# Patient Record
Sex: MALE | Race: WHITE | Employment: OTHER | URBAN - METROPOLITAN AREA
[De-identification: names, ages, dates, MRNs, and addresses within clinical notes are randomized per-mention and may not be internally consistent; named-entity substitution may affect disease eponyms.]

---

## 2018-10-25 ENCOUNTER — APPOINTMENT (OUTPATIENT)
Dept: ULTRASOUND IMAGING | Age: 68
End: 2018-10-25
Payer: MEDICARE

## 2018-10-25 ENCOUNTER — HOSPITAL ENCOUNTER (EMERGENCY)
Age: 68
Discharge: HOME | End: 2018-10-25
Attending: EMERGENCY MEDICINE
Payer: MEDICARE

## 2018-10-25 ENCOUNTER — APPOINTMENT (OUTPATIENT)
Dept: CT IMAGING | Age: 68
End: 2018-10-25
Payer: MEDICARE

## 2018-10-25 VITALS
HEART RATE: 76 BPM | BODY MASS INDEX: 31.1 KG/M2 | OXYGEN SATURATION: 93 % | SYSTOLIC BLOOD PRESSURE: 111 MMHG | RESPIRATION RATE: 20 BRPM | DIASTOLIC BLOOD PRESSURE: 76 MMHG | TEMPERATURE: 98.1 F | HEIGHT: 69 IN | WEIGHT: 210 LBS

## 2018-10-25 DIAGNOSIS — N50.811 RIGHT TESTICULAR PAIN: ICD-10-CM

## 2018-10-25 DIAGNOSIS — N23 RENAL COLIC: Primary | ICD-10-CM

## 2018-10-25 DIAGNOSIS — N20.1 URETEROLITHIASIS: ICD-10-CM

## 2018-10-25 DIAGNOSIS — N13.9 OBSTRUCTIVE UROPATHY: ICD-10-CM

## 2018-10-25 LAB
A/G RATIO: 1.9 (ref 1.1–2.2)
ALBUMIN SERPL-MCNC: 4.1 G/DL (ref 3.4–5)
ALP BLD-CCNC: 54 U/L (ref 40–129)
ALT SERPL-CCNC: 27 U/L (ref 10–40)
ANION GAP SERPL CALCULATED.3IONS-SCNC: 7 MMOL/L (ref 3–16)
AST SERPL-CCNC: 18 U/L (ref 15–37)
BILIRUB SERPL-MCNC: 0.4 MG/DL (ref 0–1)
BILIRUBIN URINE: NEGATIVE
BLOOD, URINE: NEGATIVE
BUN BLDV-MCNC: 15 MG/DL (ref 7–20)
CALCIUM SERPL-MCNC: 9.3 MG/DL (ref 8.3–10.6)
CHLORIDE BLD-SCNC: 106 MMOL/L (ref 99–110)
CLARITY: CLEAR
CO2: 28 MMOL/L (ref 21–32)
COLOR: YELLOW
CREAT SERPL-MCNC: 0.8 MG/DL (ref 0.8–1.3)
GFR AFRICAN AMERICAN: >60
GFR NON-AFRICAN AMERICAN: >60
GLOBULIN: 2.2 G/DL
GLUCOSE BLD-MCNC: 104 MG/DL (ref 70–99)
GLUCOSE URINE: NEGATIVE MG/DL
HCT VFR BLD CALC: 45.3 % (ref 40.5–52.5)
HEMOGLOBIN: 15.7 G/DL (ref 13.5–17.5)
KETONES, URINE: NEGATIVE MG/DL
LEUKOCYTE ESTERASE, URINE: NEGATIVE
MCH RBC QN AUTO: 30.8 PG (ref 26–34)
MCHC RBC AUTO-ENTMCNC: 34.7 G/DL (ref 31–36)
MCV RBC AUTO: 88.7 FL (ref 80–100)
MICROSCOPIC EXAMINATION: NORMAL
NITRITE, URINE: NEGATIVE
PDW BLD-RTO: 12.5 % (ref 12.4–15.4)
PH UA: 6
PLATELET # BLD: 206 K/UL (ref 135–450)
PMV BLD AUTO: 8.1 FL (ref 5–10.5)
POTASSIUM SERPL-SCNC: 4.4 MMOL/L (ref 3.5–5.1)
PROTEIN UA: NEGATIVE MG/DL
RBC # BLD: 5.11 M/UL (ref 4.2–5.9)
SODIUM BLD-SCNC: 141 MMOL/L (ref 136–145)
SPECIFIC GRAVITY UA: 1.02
TOTAL PROTEIN: 6.3 G/DL (ref 6.4–8.2)
URINE TYPE: NORMAL
UROBILINOGEN, URINE: 0.2 E.U./DL
WBC # BLD: 10.7 K/UL (ref 4–11)

## 2018-10-25 PROCEDURE — 76870 US EXAM SCROTUM: CPT

## 2018-10-25 PROCEDURE — 6360000002 HC RX W HCPCS: Performed by: EMERGENCY MEDICINE

## 2018-10-25 PROCEDURE — 96375 TX/PRO/DX INJ NEW DRUG ADDON: CPT

## 2018-10-25 PROCEDURE — 81003 URINALYSIS AUTO W/O SCOPE: CPT

## 2018-10-25 PROCEDURE — 96374 THER/PROPH/DIAG INJ IV PUSH: CPT

## 2018-10-25 PROCEDURE — 99284 EMERGENCY DEPT VISIT MOD MDM: CPT

## 2018-10-25 PROCEDURE — 74176 CT ABD & PELVIS W/O CONTRAST: CPT

## 2018-10-25 PROCEDURE — 85027 COMPLETE CBC AUTOMATED: CPT

## 2018-10-25 PROCEDURE — 80053 COMPREHEN METABOLIC PANEL: CPT

## 2018-10-25 RX ORDER — OXYCODONE HYDROCHLORIDE AND ACETAMINOPHEN 5; 325 MG/1; MG/1
1-2 TABLET ORAL EVERY 4 HOURS PRN
Qty: 15 TABLET | Refills: 0 | Status: SHIPPED | OUTPATIENT
Start: 2018-10-25 | End: 2018-11-01

## 2018-10-25 RX ORDER — MORPHINE SULFATE 4 MG/ML
4 INJECTION, SOLUTION INTRAMUSCULAR; INTRAVENOUS EVERY 30 MIN PRN
Status: DISCONTINUED | OUTPATIENT
Start: 2018-10-25 | End: 2018-10-25 | Stop reason: HOSPADM

## 2018-10-25 RX ORDER — TAMSULOSIN HYDROCHLORIDE 0.4 MG/1
CAPSULE ORAL
Status: ON HOLD | COMMUNITY
Start: 2018-10-08 | End: 2022-06-07 | Stop reason: HOSPADM

## 2018-10-25 RX ORDER — ONDANSETRON 2 MG/ML
4 INJECTION INTRAMUSCULAR; INTRAVENOUS EVERY 30 MIN PRN
Status: DISCONTINUED | OUTPATIENT
Start: 2018-10-25 | End: 2018-10-25 | Stop reason: HOSPADM

## 2018-10-25 RX ORDER — PROCHLORPERAZINE MALEATE 10 MG
TABLET ORAL
Refills: 0 | COMMUNITY
Start: 2018-08-20 | End: 2018-10-25

## 2018-10-25 RX ORDER — CIPROFLOXACIN 500 MG/1
500 TABLET, FILM COATED ORAL 2 TIMES DAILY
Qty: 6 TABLET | Refills: 0 | Status: SHIPPED | OUTPATIENT
Start: 2018-10-25 | End: 2018-10-28

## 2018-10-25 RX ADMIN — MORPHINE SULFATE 4 MG: 4 INJECTION, SOLUTION INTRAMUSCULAR; INTRAVENOUS at 08:03

## 2018-10-25 RX ADMIN — ONDANSETRON 4 MG: 2 INJECTION, SOLUTION INTRAMUSCULAR; INTRAVENOUS at 08:03

## 2018-10-25 ASSESSMENT — ENCOUNTER SYMPTOMS
WHEEZING: 0
CONSTIPATION: 0
ANAL BLEEDING: 0
STRIDOR: 0
COUGH: 0
ABDOMINAL PAIN: 0
ABDOMINAL DISTENTION: 0
NAUSEA: 0
BACK PAIN: 0
VOMITING: 0
EYE PAIN: 0
PHOTOPHOBIA: 0
SORE THROAT: 0
SHORTNESS OF BREATH: 0
VOICE CHANGE: 0
RHINORRHEA: 0
EYE REDNESS: 0
FACIAL SWELLING: 0
BLOOD IN STOOL: 0
EYE ITCHING: 0
CHEST TIGHTNESS: 0
TROUBLE SWALLOWING: 0
EYE DISCHARGE: 0
DIARRHEA: 0
SINUS PRESSURE: 0

## 2018-10-25 ASSESSMENT — PAIN SCALES - GENERAL: PAINLEVEL_OUTOF10: 9

## 2018-10-25 ASSESSMENT — PAIN DESCRIPTION - PAIN TYPE: TYPE: ACUTE PAIN

## 2018-10-25 ASSESSMENT — PAIN DESCRIPTION - LOCATION: LOCATION: PENIS;SCROTUM

## 2018-10-25 ASSESSMENT — PAIN DESCRIPTION - FREQUENCY: FREQUENCY: CONTINUOUS

## 2018-10-25 ASSESSMENT — PAIN DESCRIPTION - DESCRIPTORS: DESCRIPTORS: CONSTANT

## 2018-10-25 NOTE — ED NOTES
Pt assisted up to bedside to void with urinal.  Urine specimen collected.        Luiza Galaviz RN  10/25/18 8422

## 2018-10-25 NOTE — ED PROVIDER NOTES
GI/Bowel: Evaluation of the hollow GI tract demonstrates a stable diverticulum off the posterior aspect of the gastric fundus. There is a stable duodenal diverticulum as well. There is no abnormal bowel wall thickening, dilatation, or obstruction. The appendix is normal.  There is mild colonic diverticulosis, though no evidence of diverticulitis. Pelvis: There is the aforementioned calculus at the right UVJ. The urinary bladder is otherwise normal.  There is stable prostatomegaly with multiple prostatic calcifications noted. There is no free pelvic fluid or pathologic pelvic lymphadenopathy. Peritoneum/Retroperitoneum: No intraperitoneal free air or free fluid is identified. No pathologic lymphadenopathy is seen. There is atherosclerotic disease of the abdominal aorta, without evidence of aneurysm. There is a small approximate 3.9 x 2.2 cm fat containing ventral hernia, without evidence of incarceration or bowel involvement. Bones/Soft Tissues: There is multilevel degenerative change throughout the thoracolumbar spine with diffuse idiopathic skeletal hyperostosis noted. There are vacuum disc phenomena present at L4-L5 and L5-S1. No osteolytic or osteoblastic lesion is identified. 1. There is a 4 mm calculus at the right UVJ with mild right hydronephrosis. 2. There is a 2 mm nonobstructing calculus within the right kidney lower pole. 3. Stable bilateral renal cysts. 4. Patient status post cholecystectomy. 5. Colonic diverticulosis, though no evidence of diverticulitis. 6. Stable mild prostatomegaly. 7. Small fat containing ventral hernia. Us Scrotum And Testicles    Result Date: 10/25/2018  EXAMINATION: ULTRASOUND OF THE SCROTUM/TESTICLES WITH COLOR DOPPLER FLOW EVALUATION 10/25/2018 COMPARISON: None. HISTORY: ORDERING SYSTEM PROVIDED HISTORY: right pain FINDINGS: Measurements: Right testicle: 3.8 x 2.3 x 3.0 cm. Left testicle: 3.5 x 2.1 x 3.3 cm. Right: Grey scale:   The right testicle demonstrates

## 2022-06-05 ENCOUNTER — HOSPITAL ENCOUNTER (INPATIENT)
Age: 72
LOS: 1 days | Discharge: HOME | DRG: 694 | End: 2022-06-07
Attending: EMERGENCY MEDICINE | Admitting: INTERNAL MEDICINE
Payer: MEDICARE

## 2022-06-05 DIAGNOSIS — I25.10 CORONARY ARTERY CALCIFICATION SEEN ON CAT SCAN: ICD-10-CM

## 2022-06-05 DIAGNOSIS — M54.50 ACUTE BILATERAL LOW BACK PAIN WITHOUT SCIATICA: ICD-10-CM

## 2022-06-05 DIAGNOSIS — R26.2 TROUBLE WALKING: ICD-10-CM

## 2022-06-05 DIAGNOSIS — N20.1 RIGHT URETERAL STONE: Primary | ICD-10-CM

## 2022-06-05 LAB
A/G RATIO: 2 (ref 1.1–2.2)
ALBUMIN SERPL-MCNC: 4.5 G/DL (ref 3.4–5)
ALP BLD-CCNC: 55 U/L (ref 40–129)
ALT SERPL-CCNC: 29 U/L (ref 10–40)
ANION GAP SERPL CALCULATED.3IONS-SCNC: 12 MMOL/L (ref 3–16)
AST SERPL-CCNC: 26 U/L (ref 15–37)
BASOPHILS ABSOLUTE: 0.1 K/UL (ref 0–0.2)
BASOPHILS RELATIVE PERCENT: 0.8 %
BILIRUB SERPL-MCNC: <0.2 MG/DL (ref 0–1)
BILIRUBIN URINE: NEGATIVE
BLOOD, URINE: NEGATIVE
BUN BLDV-MCNC: 17 MG/DL (ref 7–20)
CALCIUM SERPL-MCNC: 9.3 MG/DL (ref 8.3–10.6)
CHLORIDE BLD-SCNC: 103 MMOL/L (ref 99–110)
CLARITY: CLEAR
CO2: 24 MMOL/L (ref 21–32)
COLOR: YELLOW
CREAT SERPL-MCNC: 0.8 MG/DL (ref 0.8–1.3)
EOSINOPHILS ABSOLUTE: 0.4 K/UL (ref 0–0.6)
EOSINOPHILS RELATIVE PERCENT: 4.9 %
GFR AFRICAN AMERICAN: >60
GFR NON-AFRICAN AMERICAN: >60
GLUCOSE BLD-MCNC: 144 MG/DL (ref 70–99)
GLUCOSE URINE: NEGATIVE MG/DL
HCT VFR BLD CALC: 46.2 % (ref 40.5–52.5)
HEMOGLOBIN: 15.7 G/DL (ref 13.5–17.5)
KETONES, URINE: NEGATIVE MG/DL
LEUKOCYTE ESTERASE, URINE: NEGATIVE
LIPASE: 26 U/L (ref 13–60)
LYMPHOCYTES ABSOLUTE: 1.8 K/UL (ref 1–5.1)
LYMPHOCYTES RELATIVE PERCENT: 19.9 %
MAGNESIUM: 2 MG/DL (ref 1.8–2.4)
MCH RBC QN AUTO: 30.1 PG (ref 26–34)
MCHC RBC AUTO-ENTMCNC: 33.9 G/DL (ref 31–36)
MCV RBC AUTO: 88.9 FL (ref 80–100)
MICROSCOPIC EXAMINATION: NORMAL
MONOCYTES ABSOLUTE: 0.5 K/UL (ref 0–1.3)
MONOCYTES RELATIVE PERCENT: 5.3 %
NEUTROPHILS ABSOLUTE: 6.1 K/UL (ref 1.7–7.7)
NEUTROPHILS RELATIVE PERCENT: 69.1 %
NITRITE, URINE: NEGATIVE
PDW BLD-RTO: 12.6 % (ref 12.4–15.4)
PH UA: 5.5 (ref 5–8)
PLATELET # BLD: 237 K/UL (ref 135–450)
PMV BLD AUTO: 8 FL (ref 5–10.5)
POTASSIUM SERPL-SCNC: 3.7 MMOL/L (ref 3.5–5.1)
PROTEIN UA: NEGATIVE MG/DL
RBC # BLD: 5.2 M/UL (ref 4.2–5.9)
SODIUM BLD-SCNC: 139 MMOL/L (ref 136–145)
SPECIFIC GRAVITY UA: >=1.03 (ref 1–1.03)
TOTAL PROTEIN: 6.7 G/DL (ref 6.4–8.2)
URINE REFLEX TO CULTURE: NORMAL
URINE TYPE: NORMAL
UROBILINOGEN, URINE: 0.2 E.U./DL
WBC # BLD: 8.8 K/UL (ref 4–11)

## 2022-06-05 PROCEDURE — 96375 TX/PRO/DX INJ NEW DRUG ADDON: CPT

## 2022-06-05 PROCEDURE — 85025 COMPLETE CBC W/AUTO DIFF WBC: CPT

## 2022-06-05 PROCEDURE — 6360000002 HC RX W HCPCS: Performed by: EMERGENCY MEDICINE

## 2022-06-05 PROCEDURE — 83735 ASSAY OF MAGNESIUM: CPT

## 2022-06-05 PROCEDURE — 2580000003 HC RX 258: Performed by: EMERGENCY MEDICINE

## 2022-06-05 PROCEDURE — 6370000000 HC RX 637 (ALT 250 FOR IP): Performed by: EMERGENCY MEDICINE

## 2022-06-05 PROCEDURE — 80053 COMPREHEN METABOLIC PANEL: CPT

## 2022-06-05 PROCEDURE — 83690 ASSAY OF LIPASE: CPT

## 2022-06-05 PROCEDURE — 81003 URINALYSIS AUTO W/O SCOPE: CPT

## 2022-06-05 PROCEDURE — 96374 THER/PROPH/DIAG INJ IV PUSH: CPT

## 2022-06-05 PROCEDURE — 99285 EMERGENCY DEPT VISIT HI MDM: CPT

## 2022-06-05 RX ORDER — DIAZEPAM 5 MG/1
5 TABLET ORAL ONCE
Status: COMPLETED | OUTPATIENT
Start: 2022-06-05 | End: 2022-06-05

## 2022-06-05 RX ORDER — HYDROCODONE BITARTRATE AND ACETAMINOPHEN 5; 325 MG/1; MG/1
1 TABLET ORAL ONCE
Status: COMPLETED | OUTPATIENT
Start: 2022-06-05 | End: 2022-06-05

## 2022-06-05 RX ORDER — 0.9 % SODIUM CHLORIDE 0.9 %
500 INTRAVENOUS SOLUTION INTRAVENOUS ONCE
Status: COMPLETED | OUTPATIENT
Start: 2022-06-05 | End: 2022-06-05

## 2022-06-05 RX ORDER — AZITHROMYCIN 250 MG/1
TABLET, FILM COATED ORAL
Status: ON HOLD | COMMUNITY
Start: 2022-04-06 | End: 2022-06-07 | Stop reason: HOSPADM

## 2022-06-05 RX ORDER — METHOCARBAMOL 500 MG/1
TABLET, FILM COATED ORAL
COMMUNITY
Start: 2022-05-24

## 2022-06-05 RX ORDER — ONDANSETRON 2 MG/ML
4 INJECTION INTRAMUSCULAR; INTRAVENOUS ONCE
Status: COMPLETED | OUTPATIENT
Start: 2022-06-05 | End: 2022-06-05

## 2022-06-05 RX ORDER — METHOCARBAMOL 500 MG/1
500 TABLET, FILM COATED ORAL 3 TIMES DAILY PRN
COMMUNITY
Start: 2022-05-24

## 2022-06-05 RX ORDER — UBIDECARENONE 50 MG
50 CAPSULE ORAL DAILY
COMMUNITY

## 2022-06-05 RX ORDER — MORPHINE SULFATE 4 MG/ML
4 INJECTION, SOLUTION INTRAMUSCULAR; INTRAVENOUS ONCE
Status: DISCONTINUED | OUTPATIENT
Start: 2022-06-05 | End: 2022-06-05

## 2022-06-05 RX ADMIN — HYDROCODONE BITARTRATE AND ACETAMINOPHEN 1 TABLET: 5; 325 TABLET ORAL at 23:25

## 2022-06-05 RX ADMIN — DIAZEPAM 5 MG: 5 TABLET ORAL at 23:25

## 2022-06-05 RX ADMIN — ONDANSETRON 4 MG: 2 INJECTION INTRAMUSCULAR; INTRAVENOUS at 23:24

## 2022-06-05 RX ADMIN — SODIUM CHLORIDE 500 ML: 9 INJECTION, SOLUTION INTRAVENOUS at 23:21

## 2022-06-05 ASSESSMENT — PAIN - FUNCTIONAL ASSESSMENT
PAIN_FUNCTIONAL_ASSESSMENT: 0-10

## 2022-06-05 ASSESSMENT — PAIN DESCRIPTION - DESCRIPTORS
DESCRIPTORS: SPASM
DESCRIPTORS: SPASM

## 2022-06-05 ASSESSMENT — PAIN DESCRIPTION - ORIENTATION
ORIENTATION: LOWER
ORIENTATION: LOWER

## 2022-06-05 ASSESSMENT — PAIN DESCRIPTION - PAIN TYPE
TYPE: ACUTE PAIN
TYPE: ACUTE PAIN

## 2022-06-05 ASSESSMENT — PAIN DESCRIPTION - FREQUENCY
FREQUENCY: INTERMITTENT

## 2022-06-05 ASSESSMENT — PAIN DESCRIPTION - LOCATION
LOCATION: BACK

## 2022-06-05 ASSESSMENT — PAIN SCALES - GENERAL
PAINLEVEL_OUTOF10: 10

## 2022-06-06 ENCOUNTER — APPOINTMENT (OUTPATIENT)
Dept: CT IMAGING | Age: 72
DRG: 694 | End: 2022-06-06
Payer: MEDICARE

## 2022-06-06 ENCOUNTER — ANESTHESIA (OUTPATIENT)
Dept: OPERATING ROOM | Age: 72
DRG: 694 | End: 2022-06-06
Payer: MEDICARE

## 2022-06-06 ENCOUNTER — APPOINTMENT (OUTPATIENT)
Dept: GENERAL RADIOLOGY | Age: 72
DRG: 694 | End: 2022-06-06
Payer: MEDICARE

## 2022-06-06 ENCOUNTER — ANESTHESIA EVENT (OUTPATIENT)
Dept: OPERATING ROOM | Age: 72
DRG: 694 | End: 2022-06-06
Payer: MEDICARE

## 2022-06-06 PROBLEM — I25.10 CORONARY ARTERY CALCIFICATION SEEN ON CAT SCAN: Status: ACTIVE | Noted: 2022-06-06

## 2022-06-06 PROBLEM — N20.0 RIGHT NEPHROLITHIASIS: Status: ACTIVE | Noted: 2022-06-06

## 2022-06-06 LAB
A/G RATIO: 2.6 (ref 1.1–2.2)
ALBUMIN SERPL-MCNC: 4.1 G/DL (ref 3.4–5)
ALP BLD-CCNC: 48 U/L (ref 40–129)
ALT SERPL-CCNC: 23 U/L (ref 10–40)
ANION GAP SERPL CALCULATED.3IONS-SCNC: 10 MMOL/L (ref 3–16)
AST SERPL-CCNC: 23 U/L (ref 15–37)
BASOPHILS ABSOLUTE: 0 K/UL (ref 0–0.2)
BASOPHILS RELATIVE PERCENT: 0.5 %
BILIRUB SERPL-MCNC: 0.4 MG/DL (ref 0–1)
BUN BLDV-MCNC: 17 MG/DL (ref 7–20)
CALCIUM SERPL-MCNC: 8.9 MG/DL (ref 8.3–10.6)
CHLORIDE BLD-SCNC: 106 MMOL/L (ref 99–110)
CO2: 27 MMOL/L (ref 21–32)
CREAT SERPL-MCNC: 0.9 MG/DL (ref 0.8–1.3)
EOSINOPHILS ABSOLUTE: 0.4 K/UL (ref 0–0.6)
EOSINOPHILS RELATIVE PERCENT: 5.4 %
GFR AFRICAN AMERICAN: >60
GFR NON-AFRICAN AMERICAN: >60
GLUCOSE BLD-MCNC: 89 MG/DL (ref 70–99)
GLUCOSE BLD-MCNC: 91 MG/DL (ref 70–99)
HCT VFR BLD CALC: 43.8 % (ref 40.5–52.5)
HEMOGLOBIN: 14.8 G/DL (ref 13.5–17.5)
LYMPHOCYTES ABSOLUTE: 1.7 K/UL (ref 1–5.1)
LYMPHOCYTES RELATIVE PERCENT: 22.1 %
MCH RBC QN AUTO: 30.6 PG (ref 26–34)
MCHC RBC AUTO-ENTMCNC: 33.8 G/DL (ref 31–36)
MCV RBC AUTO: 90.6 FL (ref 80–100)
MONOCYTES ABSOLUTE: 0.5 K/UL (ref 0–1.3)
MONOCYTES RELATIVE PERCENT: 6.9 %
NEUTROPHILS ABSOLUTE: 4.9 K/UL (ref 1.7–7.7)
NEUTROPHILS RELATIVE PERCENT: 65.1 %
PDW BLD-RTO: 12.9 % (ref 12.4–15.4)
PERFORMED ON: NORMAL
PLATELET # BLD: 200 K/UL (ref 135–450)
PMV BLD AUTO: 7.7 FL (ref 5–10.5)
POTASSIUM REFLEX MAGNESIUM: 4.4 MMOL/L (ref 3.5–5.1)
RBC # BLD: 4.84 M/UL (ref 4.2–5.9)
SARS-COV-2, NAAT: NOT DETECTED
SODIUM BLD-SCNC: 143 MMOL/L (ref 136–145)
TOTAL PROTEIN: 5.7 G/DL (ref 6.4–8.2)
WBC # BLD: 7.5 K/UL (ref 4–11)

## 2022-06-06 PROCEDURE — 87635 SARS-COV-2 COVID-19 AMP PRB: CPT

## 2022-06-06 PROCEDURE — 3600000004 HC SURGERY LEVEL 4 BASE: Performed by: UROLOGY

## 2022-06-06 PROCEDURE — 85025 COMPLETE CBC W/AUTO DIFF WBC: CPT

## 2022-06-06 PROCEDURE — 6370000000 HC RX 637 (ALT 250 FOR IP): Performed by: INTERNAL MEDICINE

## 2022-06-06 PROCEDURE — 7100000000 HC PACU RECOVERY - FIRST 15 MIN: Performed by: UROLOGY

## 2022-06-06 PROCEDURE — 3700000001 HC ADD 15 MINUTES (ANESTHESIA): Performed by: UROLOGY

## 2022-06-06 PROCEDURE — 2580000003 HC RX 258: Performed by: UROLOGY

## 2022-06-06 PROCEDURE — 74018 RADEX ABDOMEN 1 VIEW: CPT

## 2022-06-06 PROCEDURE — 6360000002 HC RX W HCPCS: Performed by: UROLOGY

## 2022-06-06 PROCEDURE — G0378 HOSPITAL OBSERVATION PER HR: HCPCS

## 2022-06-06 PROCEDURE — 3209999900 FLUORO FOR SURGICAL PROCEDURES

## 2022-06-06 PROCEDURE — 6360000002 HC RX W HCPCS: Performed by: NURSE ANESTHETIST, CERTIFIED REGISTERED

## 2022-06-06 PROCEDURE — 96375 TX/PRO/DX INJ NEW DRUG ADDON: CPT

## 2022-06-06 PROCEDURE — 96365 THER/PROPH/DIAG IV INF INIT: CPT

## 2022-06-06 PROCEDURE — 2580000003 HC RX 258: Performed by: INTERNAL MEDICINE

## 2022-06-06 PROCEDURE — 7100000001 HC PACU RECOVERY - ADDTL 15 MIN: Performed by: UROLOGY

## 2022-06-06 PROCEDURE — 2500000003 HC RX 250 WO HCPCS: Performed by: NURSE ANESTHETIST, CERTIFIED REGISTERED

## 2022-06-06 PROCEDURE — BT1D1ZZ FLUOROSCOPY OF RIGHT KIDNEY, URETER AND BLADDER USING LOW OSMOLAR CONTRAST: ICD-10-PCS | Performed by: UROLOGY

## 2022-06-06 PROCEDURE — 74177 CT ABD & PELVIS W/CONTRAST: CPT

## 2022-06-06 PROCEDURE — 6360000004 HC RX CONTRAST MEDICATION: Performed by: UROLOGY

## 2022-06-06 PROCEDURE — 2709999900 HC NON-CHARGEABLE SUPPLY: Performed by: UROLOGY

## 2022-06-06 PROCEDURE — 3700000000 HC ANESTHESIA ATTENDED CARE: Performed by: UROLOGY

## 2022-06-06 PROCEDURE — 6360000002 HC RX W HCPCS: Performed by: EMERGENCY MEDICINE

## 2022-06-06 PROCEDURE — 6360000002 HC RX W HCPCS

## 2022-06-06 PROCEDURE — 0TJ98ZZ INSPECTION OF URETER, VIA NATURAL OR ARTIFICIAL OPENING ENDOSCOPIC: ICD-10-PCS | Performed by: UROLOGY

## 2022-06-06 PROCEDURE — 6360000004 HC RX CONTRAST MEDICATION: Performed by: EMERGENCY MEDICINE

## 2022-06-06 PROCEDURE — 6360000002 HC RX W HCPCS: Performed by: INTERNAL MEDICINE

## 2022-06-06 PROCEDURE — 3600000014 HC SURGERY LEVEL 4 ADDTL 15MIN: Performed by: UROLOGY

## 2022-06-06 PROCEDURE — 1200000000 HC SEMI PRIVATE

## 2022-06-06 PROCEDURE — 6370000000 HC RX 637 (ALT 250 FOR IP): Performed by: UROLOGY

## 2022-06-06 PROCEDURE — 36415 COLL VENOUS BLD VENIPUNCTURE: CPT

## 2022-06-06 PROCEDURE — 96366 THER/PROPH/DIAG IV INF ADDON: CPT

## 2022-06-06 PROCEDURE — C1769 GUIDE WIRE: HCPCS | Performed by: UROLOGY

## 2022-06-06 PROCEDURE — 80053 COMPREHEN METABOLIC PANEL: CPT

## 2022-06-06 PROCEDURE — 3209999900 CT LUMBAR SPINE TRAUMA RECONSTRUCTION

## 2022-06-06 RX ORDER — MAGNESIUM SULFATE IN WATER 40 MG/ML
2000 INJECTION, SOLUTION INTRAVENOUS PRN
Status: DISCONTINUED | OUTPATIENT
Start: 2022-06-06 | End: 2022-06-06

## 2022-06-06 RX ORDER — PROPOFOL 10 MG/ML
INJECTION, EMULSION INTRAVENOUS PRN
Status: DISCONTINUED | OUTPATIENT
Start: 2022-06-06 | End: 2022-06-06 | Stop reason: SDUPTHER

## 2022-06-06 RX ORDER — PHENYLEPHRINE HCL IN 0.9% NACL 1 MG/10 ML
SYRINGE (ML) INTRAVENOUS PRN
Status: DISCONTINUED | OUTPATIENT
Start: 2022-06-06 | End: 2022-06-06 | Stop reason: SDUPTHER

## 2022-06-06 RX ORDER — ATORVASTATIN CALCIUM 10 MG/1
20 TABLET, FILM COATED ORAL DAILY
Status: DISCONTINUED | OUTPATIENT
Start: 2022-06-06 | End: 2022-06-07 | Stop reason: HOSPADM

## 2022-06-06 RX ORDER — LIDOCAINE HYDROCHLORIDE 20 MG/ML
INJECTION, SOLUTION INFILTRATION; PERINEURAL PRN
Status: DISCONTINUED | OUTPATIENT
Start: 2022-06-06 | End: 2022-06-06 | Stop reason: SDUPTHER

## 2022-06-06 RX ORDER — MORPHINE SULFATE 4 MG/ML
4 INJECTION, SOLUTION INTRAMUSCULAR; INTRAVENOUS ONCE
Status: DISCONTINUED | OUTPATIENT
Start: 2022-06-06 | End: 2022-06-07 | Stop reason: HOSPADM

## 2022-06-06 RX ORDER — LABETALOL HYDROCHLORIDE 5 MG/ML
10 INJECTION, SOLUTION INTRAVENOUS
Status: DISCONTINUED | OUTPATIENT
Start: 2022-06-06 | End: 2022-06-06 | Stop reason: HOSPADM

## 2022-06-06 RX ORDER — OXYCODONE HYDROCHLORIDE 5 MG/1
5 TABLET ORAL PRN
Status: DISCONTINUED | OUTPATIENT
Start: 2022-06-06 | End: 2022-06-06 | Stop reason: HOSPADM

## 2022-06-06 RX ORDER — OXYCODONE HYDROCHLORIDE 5 MG/1
10 TABLET ORAL PRN
Status: DISCONTINUED | OUTPATIENT
Start: 2022-06-06 | End: 2022-06-06 | Stop reason: HOSPADM

## 2022-06-06 RX ORDER — OXYCODONE HYDROCHLORIDE 5 MG/1
5 TABLET ORAL EVERY 4 HOURS PRN
Status: DISCONTINUED | OUTPATIENT
Start: 2022-06-06 | End: 2022-06-07 | Stop reason: HOSPADM

## 2022-06-06 RX ORDER — ACETAMINOPHEN 325 MG/1
650 TABLET ORAL EVERY 6 HOURS PRN
Status: DISCONTINUED | OUTPATIENT
Start: 2022-06-06 | End: 2022-06-07 | Stop reason: HOSPADM

## 2022-06-06 RX ORDER — DIPHENHYDRAMINE HYDROCHLORIDE 50 MG/ML
12.5 INJECTION INTRAMUSCULAR; INTRAVENOUS
Status: DISCONTINUED | OUTPATIENT
Start: 2022-06-06 | End: 2022-06-06 | Stop reason: HOSPADM

## 2022-06-06 RX ORDER — MAGNESIUM HYDROXIDE 1200 MG/15ML
LIQUID ORAL PRN
Status: DISCONTINUED | OUTPATIENT
Start: 2022-06-06 | End: 2022-06-06 | Stop reason: HOSPADM

## 2022-06-06 RX ORDER — SODIUM CHLORIDE 9 MG/ML
1000 INJECTION, SOLUTION INTRAVENOUS CONTINUOUS
Status: ACTIVE | OUTPATIENT
Start: 2022-06-06 | End: 2022-06-06

## 2022-06-06 RX ORDER — PROMETHAZINE HYDROCHLORIDE 25 MG/1
12.5 TABLET ORAL EVERY 6 HOURS PRN
Status: DISCONTINUED | OUTPATIENT
Start: 2022-06-06 | End: 2022-06-07 | Stop reason: HOSPADM

## 2022-06-06 RX ORDER — FENTANYL CITRATE 50 UG/ML
INJECTION, SOLUTION INTRAMUSCULAR; INTRAVENOUS PRN
Status: DISCONTINUED | OUTPATIENT
Start: 2022-06-06 | End: 2022-06-06 | Stop reason: SDUPTHER

## 2022-06-06 RX ORDER — FAMOTIDINE 20 MG/1
20 TABLET, FILM COATED ORAL 2 TIMES DAILY
Status: DISCONTINUED | OUTPATIENT
Start: 2022-06-06 | End: 2022-06-07 | Stop reason: HOSPADM

## 2022-06-06 RX ORDER — ENOXAPARIN SODIUM 100 MG/ML
40 INJECTION SUBCUTANEOUS DAILY
Status: DISCONTINUED | OUTPATIENT
Start: 2022-06-06 | End: 2022-06-07 | Stop reason: HOSPADM

## 2022-06-06 RX ORDER — SODIUM CHLORIDE 0.9 % (FLUSH) 0.9 %
10 SYRINGE (ML) INJECTION PRN
Status: DISCONTINUED | OUTPATIENT
Start: 2022-06-06 | End: 2022-06-07 | Stop reason: HOSPADM

## 2022-06-06 RX ORDER — SODIUM CHLORIDE 0.9 % (FLUSH) 0.9 %
10 SYRINGE (ML) INJECTION EVERY 12 HOURS SCHEDULED
Status: DISCONTINUED | OUTPATIENT
Start: 2022-06-06 | End: 2022-06-07 | Stop reason: HOSPADM

## 2022-06-06 RX ORDER — ACETAMINOPHEN 650 MG/1
650 SUPPOSITORY RECTAL EVERY 6 HOURS PRN
Status: DISCONTINUED | OUTPATIENT
Start: 2022-06-06 | End: 2022-06-07 | Stop reason: HOSPADM

## 2022-06-06 RX ORDER — MORPHINE SULFATE 4 MG/ML
4 INJECTION, SOLUTION INTRAMUSCULAR; INTRAVENOUS EVERY 4 HOURS PRN
Status: DISCONTINUED | OUTPATIENT
Start: 2022-06-06 | End: 2022-06-07 | Stop reason: HOSPADM

## 2022-06-06 RX ORDER — SODIUM CHLORIDE 9 MG/ML
25 INJECTION, SOLUTION INTRAVENOUS PRN
Status: DISCONTINUED | OUTPATIENT
Start: 2022-06-06 | End: 2022-06-06 | Stop reason: HOSPADM

## 2022-06-06 RX ORDER — ONDANSETRON 2 MG/ML
4 INJECTION INTRAMUSCULAR; INTRAVENOUS EVERY 6 HOURS PRN
Status: DISCONTINUED | OUTPATIENT
Start: 2022-06-06 | End: 2022-06-07 | Stop reason: HOSPADM

## 2022-06-06 RX ORDER — ONDANSETRON 2 MG/ML
4 INJECTION INTRAMUSCULAR; INTRAVENOUS
Status: DISCONTINUED | OUTPATIENT
Start: 2022-06-06 | End: 2022-06-06 | Stop reason: HOSPADM

## 2022-06-06 RX ORDER — SODIUM CHLORIDE 0.9 % (FLUSH) 0.9 %
5-40 SYRINGE (ML) INJECTION PRN
Status: DISCONTINUED | OUTPATIENT
Start: 2022-06-06 | End: 2022-06-06 | Stop reason: HOSPADM

## 2022-06-06 RX ORDER — TAMSULOSIN HYDROCHLORIDE 0.4 MG/1
0.4 CAPSULE ORAL DAILY
Status: DISCONTINUED | OUTPATIENT
Start: 2022-06-06 | End: 2022-06-07 | Stop reason: HOSPADM

## 2022-06-06 RX ORDER — ASPIRIN 81 MG/1
81 TABLET ORAL DAILY
Status: DISCONTINUED | OUTPATIENT
Start: 2022-06-06 | End: 2022-06-07 | Stop reason: HOSPADM

## 2022-06-06 RX ORDER — SODIUM CHLORIDE 9 MG/ML
INJECTION, SOLUTION INTRAVENOUS PRN
Status: DISCONTINUED | OUTPATIENT
Start: 2022-06-06 | End: 2022-06-07 | Stop reason: HOSPADM

## 2022-06-06 RX ORDER — METHOCARBAMOL 500 MG/1
500 TABLET, FILM COATED ORAL 3 TIMES DAILY PRN
Status: DISCONTINUED | OUTPATIENT
Start: 2022-06-06 | End: 2022-06-07 | Stop reason: HOSPADM

## 2022-06-06 RX ORDER — MEPERIDINE HYDROCHLORIDE 50 MG/ML
12.5 INJECTION INTRAMUSCULAR; INTRAVENOUS; SUBCUTANEOUS EVERY 5 MIN PRN
Status: DISCONTINUED | OUTPATIENT
Start: 2022-06-06 | End: 2022-06-06 | Stop reason: HOSPADM

## 2022-06-06 RX ORDER — OXYCODONE HYDROCHLORIDE 5 MG/1
10 TABLET ORAL EVERY 4 HOURS PRN
Status: DISCONTINUED | OUTPATIENT
Start: 2022-06-06 | End: 2022-06-07 | Stop reason: HOSPADM

## 2022-06-06 RX ORDER — KETOROLAC TROMETHAMINE 30 MG/ML
15 INJECTION, SOLUTION INTRAMUSCULAR; INTRAVENOUS ONCE
Status: COMPLETED | OUTPATIENT
Start: 2022-06-06 | End: 2022-06-06

## 2022-06-06 RX ORDER — POTASSIUM CHLORIDE 7.45 MG/ML
10 INJECTION INTRAVENOUS PRN
Status: DISCONTINUED | OUTPATIENT
Start: 2022-06-06 | End: 2022-06-06

## 2022-06-06 RX ORDER — SODIUM CHLORIDE 0.9 % (FLUSH) 0.9 %
5-40 SYRINGE (ML) INJECTION EVERY 12 HOURS SCHEDULED
Status: DISCONTINUED | OUTPATIENT
Start: 2022-06-06 | End: 2022-06-06 | Stop reason: HOSPADM

## 2022-06-06 RX ADMIN — SODIUM CHLORIDE 1000 ML: 9 INJECTION, SOLUTION INTRAVENOUS at 04:22

## 2022-06-06 RX ADMIN — ONDANSETRON 4 MG: 2 INJECTION INTRAMUSCULAR; INTRAVENOUS at 12:33

## 2022-06-06 RX ADMIN — KETOROLAC TROMETHAMINE 15 MG: 30 INJECTION, SOLUTION INTRAMUSCULAR; INTRAVENOUS at 01:55

## 2022-06-06 RX ADMIN — SODIUM CHLORIDE: 9 INJECTION, SOLUTION INTRAVENOUS at 12:20

## 2022-06-06 RX ADMIN — CEFAZOLIN 2000 MG: 10 INJECTION, POWDER, FOR SOLUTION INTRAVENOUS at 20:12

## 2022-06-06 RX ADMIN — Medication 200 MCG: at 12:54

## 2022-06-06 RX ADMIN — ONDANSETRON 4 MG: 2 INJECTION INTRAMUSCULAR; INTRAVENOUS at 20:07

## 2022-06-06 RX ADMIN — HYDROMORPHONE HYDROCHLORIDE 0.5 MG: 1 INJECTION, SOLUTION INTRAMUSCULAR; INTRAVENOUS; SUBCUTANEOUS at 14:09

## 2022-06-06 RX ADMIN — PROPOFOL 150 MG: 10 INJECTION, EMULSION INTRAVENOUS at 12:33

## 2022-06-06 RX ADMIN — Medication 10 ML: at 20:08

## 2022-06-06 RX ADMIN — CEFAZOLIN SODIUM 2 G: 10 INJECTION, POWDER, FOR SOLUTION INTRAVENOUS at 12:20

## 2022-06-06 RX ADMIN — FENTANYL CITRATE 25 MCG: 50 INJECTION INTRAMUSCULAR; INTRAVENOUS at 12:43

## 2022-06-06 RX ADMIN — Medication 100 MCG: at 12:48

## 2022-06-06 RX ADMIN — OXYCODONE 5 MG: 5 TABLET ORAL at 15:11

## 2022-06-06 RX ADMIN — LIDOCAINE HYDROCHLORIDE 60 MG: 20 INJECTION, SOLUTION INFILTRATION; PERINEURAL at 12:33

## 2022-06-06 RX ADMIN — FAMOTIDINE 20 MG: 20 TABLET ORAL at 21:21

## 2022-06-06 RX ADMIN — TAMSULOSIN HYDROCHLORIDE 0.4 MG: 0.4 CAPSULE ORAL at 04:19

## 2022-06-06 RX ADMIN — FENTANYL CITRATE 25 MCG: 50 INJECTION INTRAMUSCULAR; INTRAVENOUS at 12:33

## 2022-06-06 RX ADMIN — IOPAMIDOL 75 ML: 755 INJECTION, SOLUTION INTRAVENOUS at 00:20

## 2022-06-06 RX ADMIN — FENTANYL CITRATE 50 MCG: 50 INJECTION INTRAMUSCULAR; INTRAVENOUS at 12:27

## 2022-06-06 RX ADMIN — Medication 200 MCG: at 12:51

## 2022-06-06 RX ADMIN — Medication 100 MCG: at 12:35

## 2022-06-06 ASSESSMENT — PAIN SCALES - GENERAL
PAINLEVEL_OUTOF10: 0
PAINLEVEL_OUTOF10: 7
PAINLEVEL_OUTOF10: 6
PAINLEVEL_OUTOF10: 0
PAINLEVEL_OUTOF10: 6
PAINLEVEL_OUTOF10: 6
PAINLEVEL_OUTOF10: 7
PAINLEVEL_OUTOF10: 2

## 2022-06-06 ASSESSMENT — PAIN DESCRIPTION - LOCATION
LOCATION: PENIS
LOCATION: PENIS
LOCATION: BACK
LOCATION: BACK

## 2022-06-06 ASSESSMENT — PAIN DESCRIPTION - DESCRIPTORS: DESCRIPTORS: SHARP

## 2022-06-06 ASSESSMENT — ENCOUNTER SYMPTOMS
ABDOMINAL PAIN: 0
VOMITING: 0
NAUSEA: 0
SHORTNESS OF BREATH: 0
BACK PAIN: 1
DIARRHEA: 0

## 2022-06-06 ASSESSMENT — PAIN - FUNCTIONAL ASSESSMENT: PAIN_FUNCTIONAL_ASSESSMENT: NONE - DENIES PAIN

## 2022-06-06 ASSESSMENT — LIFESTYLE VARIABLES: HOW OFTEN DO YOU HAVE A DRINK CONTAINING ALCOHOL: NEVER

## 2022-06-06 ASSESSMENT — PAIN DESCRIPTION - ORIENTATION
ORIENTATION: LEFT
ORIENTATION: LEFT

## 2022-06-06 NOTE — BRIEF OP NOTE
Brief Postoperative Note      Patient: Beth Mariscal  YOB: 1950  MRN: [id number]    Date of Procedure: 6/6/2022    Pre-Op Diagnosis: RIGHT URETERAL STONE    Post-Op Diagnosis: passed right ureteral stone       Procedure(s):  CYSTOSCOPY URETEROSCOPY WITH RIGHT RETROGRADE PYELOGRAM    Surgeon(s):  Amelia Welsh MD    Assistant:  Surgical Assistant: Tierney Bell    Anesthesia: General    Estimated Blood Loss (mL): Minimal    Complications: None    Specimens:   * No specimens in log *    Implants:  * No implants in log *      Drains: * No LDAs found *    Findings: no stone in right ureter, no stent placed    Plan- can discharge later today and f/u as needed from Urology standpoint    Electronically signed by Amelia Welsh MD on 6/6/2022 at 1:04 PM

## 2022-06-06 NOTE — PROGRESS NOTES
Patient back from procedure, alert and oriented, complaining of 7/10 pain in groin.  VSS, CMU aware    Vitals:    06/06/22 1423   BP: 135/78   Pulse: 77   Resp: 18   Temp: 97.4 °F (36.3 °C)   SpO2: 96%

## 2022-06-06 NOTE — PLAN OF CARE
Problem: Pain  Goal: Verbalizes/displays adequate comfort level or baseline comfort level  6/6/2022 0937 by Shon Riedel, RN  Outcome: Progressing  Note: Pt will be satisfied with pain control with PRN morphine or oxycodone. Pt uses numeric pain rating scale with reassessments after pain med administration. Will continue to monitor progression throughout shift. Problem: Safety - Adult  Goal: Free from fall injury  Outcome: Progressing  Note: Pt will remain free from falls throughout hospital stay. Bed in lowest position with wheels locked and side rails 2/4 up. Hourly rounding completed. Patient ambulates safely independently, call light is within reach. Will continue to monitor throughout shift.

## 2022-06-06 NOTE — PROGRESS NOTES
D: Patient continues with oral airway, respirations are even and easy, 0 2 sat's are 97 % on 6 liters via simple mask. A: Assessment completed and documented.

## 2022-06-06 NOTE — ED NOTES
Pt reports LBP progressively worsening the past 2 weeks. Placed on Robaxin 500mg by Primary DR since 05/24/22. Pt reports no longer effective for low back \"spasms\". Denies Radiculopathy or loss of control of bowel and bladder. Reports \"tried Lidocaine patches on my back, we've tried Heat, we've tried cold, Excedrin. We've tried everything and nothing helps. \". Has been attending Chiropractic appts past two weeks with no relief.      John Sparks RN  06/05/22 3970

## 2022-06-06 NOTE — CONSULTS
Consult placed    Who: Dr. Mamadou Tatum AM        Electronically signed by Lilian Carlos on 6/6/2022 at 8:33 AM

## 2022-06-06 NOTE — PROGRESS NOTES
D: Patient here from OR via bed, taken to bay 7 in PACU, all current drips, treatments including presence of an oral airway, skin issues, and plan of care were reviewed by both RN's, patients care transferred with patient in stable condition.

## 2022-06-06 NOTE — PROGRESS NOTES
Patient voided 200 mL of bloody urine with blood clots, patient reports slow urine stream. Patient started shaking and became weak, RN assisted patient back to bed. Blood sugar was checked and was WNL. Patient said he was in pain and a little dizzy, he was given pain medication and crackers. Will monitor.

## 2022-06-06 NOTE — H&P
Hospital Medicine History & Physical      PCP: Tejal Gleason MD    Date of Admission: 6/5/2022    Date of Service: Pt seen/examined on 06/06/2022    Pt seen/examined face to face on and admitted as inpatient with expected LOS greater than two midnights due to medical therapy    Chief Complaint:    Chief Complaint   Patient presents with    Back Pain     \"back spasms been going on for a couple weeks\"; pain 90/01 \"with certain movement\"        History Of Present Illness:      66 y.o. male who presented to [facility] with past medical history of cholelithiasis, hyperlipidemia presented to the ED with chief complaint of back pain. Patient reported that he had a kidney stone before but has been so long ago that he does not member the symptoms. Patient reported that he has been having some back pain in the past month, patient tried heating pads, muscle relaxer that he took from PCP and then went to chiropractor with no alleviating factor. Patient then completed further evaluation. Patient reported the pain would go up to 9/10 with movement. Patient reports he does eat high salt diet and protein diet. Patient does not know what was his kidney stone composed in last nephrolithiasis, patient does report having family history of nephrolithiasis maternally. Patient describes the pain is lateral back T11-L3 area        Past Medical History:          Diagnosis Date    Cholelithiasis     Diverticulitis     Hyperlipidemia        Past Surgical History:          Procedure Laterality Date   Nayan Smith  [id number]    w/cholangiogram    FOOT SURGERY      Right r/t fx    KNEE SURGERY      TONSILLECTOMY         Medications Prior to Admission:      Prior to Admission medications    Medication Sig Start Date End Date Taking?  Authorizing Provider   methocarbamol (ROBAXIN) 500 MG tablet Take 500 mg by mouth 3 times daily as needed 5/24/22  Yes Historical Provider, MD   methocarbamol (ROBAXIN) 500 MG tablet PLEASE SEE ATTACHED FOR DETAILED DIRECTIONS 5/24/22   Historical Provider, MD   Coenzyme Q10 50 MG CAPS Take 50 mg by mouth daily    Historical Provider, MD   azithromycin (ZITHROMAX) 250 MG tablet TAKE 2 TABLETS BY MOUTH TODAY, THEN TAKE 1 TABLET DAILY FOR 4 DAYS 4/6/22   Historical Provider, MD   tamsulosin (FLOMAX) 0.4 MG capsule  10/8/18   Historical Provider, MD   GARLIC PO Take by mouth    Historical Provider, MD   KRILL OIL PO Take by mouth    Historical Provider, MD   NONFORMULARY Indications: cq 10    Historical Provider, MD   ranitidine (ZANTAC) 150 MG tablet Take 150 mg by mouth 2 times daily    Historical Provider, MD   aspirin 81 MG EC tablet Take 1 tablet by mouth daily. 10/5/11   Eileen Berman MD   therapeutic multivitamin-minerals [facility]) tablet Take 1 tablet by mouth daily. Historical Provider, MD   atorvastatin (LIPITOR) 20 MG tablet Take 20 mg by mouth daily. Historical Provider, MD       Allergies:  Patient has no known allergies. Social History:          TOBACCO:   reports that he has never smoked. He has never used smokeless tobacco.  ETOH:   reports no history of alcohol use.   E-Cigarettes/Vaping Use     Questions Responses    E-Cigarette/Vaping Use     Start Date     Passive Exposure     Quit Date     Counseling Given     Comments             Family History:      Reviewed in detail, and positive for nephrolithiasis    REVIEW OF SYSTEMS:     Constitutional:  No Fever, No Chills, No Night Sweats  ENT/Mouth:  No Nasal Congestion,  No Hoarseness, No new mouth lesion  Eyes:  No Eye Pain, No Redness, No Discharge  Cardiovascular:  No Chest Pain, No Orthopnea, No Palpitations  Respiratory:  No Cough, No Sputum, No Dyspnea  Gastrointestinal: No Vomiting, No Diarrhea,no abdominal pain  Genitourinary: No Urinary Frequency, No Hematuria, No Urinary pain  Musculoskeletal:  No worsening Arthralgias, No worsening Myalgias, plus back pain  Skin:  No new Skin Lesions, No new skin rash  Neuro:  No new weakness, No new numbness. Psych:  No suicial ideation, No Violence ideation    PHYSICAL EXAM PERFORMED:    BP (!) 148/88   Pulse 72   Temp 98.2 °F (36.8 °C) (Oral)   Resp 16   Ht 5' 8\" (1.727 m)   Wt 200 lb (90.7 kg)   SpO2 99%   BMI 30.41 kg/m²     General appearance:  mild acute distress, appears older than stated age  HEENT:   atraumatic, sclera anicteric, Conjunctivae clear. Neck: Supple,Trachea midline, no goiter  Respiratory:minimal accessory muscle usage, Normal respiratory effort. Clear to auscultation, bilaterally without wheezing  Cardiovascular:  Regular rate and rhythm, capillary refill 2 seconds  Abdomen: Soft, non-tender, non-distended with normal bowel sounds. Right flank pain, no midline tenderness  musculoskeletal:  No clubbing, cyanosis. trace edema LE bilaterally. Skin: turgor normal.  No new rashes or lesions. Neurologic: Alert and oriented x4, no new focal sensory/motor deficits. Labs:     Recent Labs     06/05/22  2320   WBC 8.8   HGB 15.7   HCT 46.2        Recent Labs     06/05/22  2320      K 3.7      CO2 24   BUN 17   CREATININE 0.8   CALCIUM 9.3     Recent Labs     06/05/22  2320   AST 26   ALT 29   BILITOT <0.2   ALKPHOS 55     No results for input(s): INR in the last 72 hours. No results for input(s): Verlena Sammy in the last 72 hours. Urinalysis:      Lab Results   Component Value Date    NITRU Negative 06/05/2022    BLOODU Negative 06/05/2022    SPECGRAV >=1.030 06/05/2022    GLUCOSEU Negative 06/05/2022    GLUCOSEU Negative 09/04/2011       Radiology:     CT ABDOMEN PELVIS W IV CONTRAST Additional Contrast? None   Final Result   1. Calculus in the mid right ureter measuring 5 mm without   hydroureteronephrosis. 2. No acute osseous abnormality of the lumbar spine. 3. Three-vessel coronary artery calcification and severe aortic valvular   calcification.          CT LUMBAR SPINE TRAUMA RECONSTRUCTION   Final Result   1. Calculus in the mid right ureter measuring 5 mm without   hydroureteronephrosis. 2. No acute osseous abnormality of the lumbar spine. 3. Three-vessel coronary artery calcification and severe aortic valvular   calcification. ASSESSMENT AND PLAN:    Active Hospital Problems    Diagnosis Date Noted    Right nephrolithiasis [N20.0] 06/06/2022     Priority: Medium     Acute right nephrolithiasis  Flomax initiated  Urology consulted, much appreciated    GERD: Continue home medication  Hyperlipidemia: Controlled on home Statin. Outpatient PCP follow up post-discharge  Class I obesity:Complicating assessment and treatment. Placing patient at risk for multiple co-morbidities as well as early death and contributing to the patient's presentation.  Education, and counseling    Diet: NPO except meds ordered    DVT Prophylaxis: lovenox    Dispo:   Expected LOS greater than two [address] [facility], DO

## 2022-06-06 NOTE — ACP (ADVANCE CARE PLANNING)
Advance Care Planning     Advance Care Planning Inpatient Note  [facility] Department    Today's Date: 6/6/2022  Unit: [place] OR    Received request from IDT Member. Upon review of chart and communication with care team, patient's decision making abilities are not in question. . Patient was/were present in the room during visit. Goals of ACP Conversation:  Discuss advance care planning documents    Health Care Decision Makers:       Primary Decision Maker: Kana Asa - Other - [phone]    Summary:  Completed New Documents    Advance Care Planning Documents (Patient Wishes):  Healthcare Power of /Advance Directive Appointment of Health Care Agent     Assessment:  Pt said he has three children. Pt named Usha Rogel as primary decision maker. Said she is an RN, will talk to her first before completing his Living Will. HC-POA completed. Interventions:  Provided education on documents for clarity and greater understanding  Discussed and provided education on state decision maker hierarchy  Assisted in the completion of documents according to patient's wishes at this time        Outcomes/Plan:  New advance directive completed. Returned original document(s) to patient, as well as copies for distribution to appointed agents  Copy of advance directive given to staff to scan into medical record.   Teach Back Method used to verify the patient's and/or Healthcare Decision Maker's understanding of key information in the advance directive documents    Electronically signed by Chaplain Fredi on 6/6/2022 at 1:57 PM

## 2022-06-06 NOTE — ED NOTES
Pt sleeping quietly in cot. Pt's wife remains at bedside. VSS. Nursing call light within pt's reach.      Gabriel Serrano RN  06/06/22 9483

## 2022-06-06 NOTE — ANESTHESIA PRE PROCEDURE
Department of Anesthesiology  Preprocedure Note       Name:  Beth Mariscal   Age:  66 y.o.  :  1950                                          MRN:  [id number]         Date:  2022      Surgeon: Casie Buchanan):  Amelia Welsh MD    Procedure: Procedure(s):  CYSTOSCOPY URETEROSCOPY POSSIBLE RIGHT  URETERAL STENT INSERTION    Medications prior to admission:   Prior to Admission medications    Medication Sig Start Date End Date Taking? Authorizing Provider   methocarbamol (ROBAXIN) 500 MG tablet Take 500 mg by mouth 3 times daily as needed 22  Yes Historical Provider, MD   methocarbamol (ROBAXIN) 500 MG tablet PLEASE SEE ATTACHED FOR DETAILED DIRECTIONS 22   Historical Provider, MD   Coenzyme Q10 50 MG CAPS Take 50 mg by mouth daily    Historical Provider, MD   azithromycin (ZITHROMAX) 250 MG tablet TAKE 2 TABLETS BY MOUTH TODAY, THEN TAKE 1 TABLET DAILY FOR 4 DAYS  Patient not taking: Reported on 2022   Historical Provider, MD   tamsulosin (FLOMAX) 0.4 MG capsule  10/8/18   Historical Provider, MD   GARLIC PO Take by mouth    Historical Provider, MD   KRILL OIL PO Take by mouth    Historical Provider, MD   NONFORMULARY Indications: cq 10  Patient not taking: Reported on 2022    Historical Provider, MD   ranitidine (ZANTAC) 150 MG tablet Take 150 mg by mouth 2 times daily  Patient not taking: Reported on 2022    Historical Provider, MD   aspirin 81 MG EC tablet Take 1 tablet by mouth daily. 10/5/11   Vandana Kramer MD   therapeutic multivitamin-minerals [facility]) tablet Take 1 tablet by mouth daily. Historical Provider, MD   atorvastatin (LIPITOR) 20 MG tablet Take 20 mg by mouth daily.       Historical Provider, MD       Current medications:    Current Facility-Administered Medications   Medication Dose Route Frequency Provider Last Rate Last Admin    morphine sulfate (PF) injection 4 mg  4 mg IntraVENous Once Alexi Clark MD        oxyCODONE (Tierney Parlor) immediate release tablet 5 mg  5 mg Oral Q4H PRN Susannah Cage, DO        oxyCODONE (ROXICODONE) immediate release tablet 10 mg  10 mg Oral Q4H PRN Dylan Marie,         sodium chloride flush 0.9 % injection 10 mL  10 mL IntraVENous 2 times per day June Lindau CARRINGTON Marie, DO        sodium chloride flush 0.9 % injection 10 mL  10 mL IntraVENous PRN Dylan Marie, DO        0.9 % sodium chloride infusion   IntraVENous PRN Dylan Marie, DO        enoxaparin (LOVENOX) injection 40 mg  40 mg SubCUTAneous Daily Dylan Marie, DO        promethazine (PHENERGAN) tablet 12.5 mg  12.5 mg Oral Q6H PRN Dylan Marie, DO        Or    ondansetron (ZOFRAN) injection 4 mg  4 mg IntraVENous Q6H PRN Dante Marie, DO        acetaminophen (TYLENOL) tablet 650 mg  650 mg Oral Q6H PRN Dylan Marie DO        Or    acetaminophen (TYLENOL) suppository 650 mg  650 mg Rectal Q6H PRN Dante Marie, DO        morphine sulfate (PF) injection 4 mg  4 mg IntraVENous Q4H PRN Susannah Cage, DO        tamsulosin (FLOMAX) capsule 0.4 mg  0.4 mg Oral Daily Dylan Marie DO   0.4 mg at 06/06/22 0419    atorvastatin (LIPITOR) tablet 20 mg  20 mg Oral Daily Dylan Marie, DO        aspirin EC tablet 81 mg  81 mg Oral Daily Dylan Marie DO        methocarbamol (ROBAXIN) tablet 500 mg  500 mg Oral TID PRN Dante Marie, DO        famotidine (PEPCID) tablet 20 mg  20 mg Oral BID Dylan Marie DO           Allergies:  No Known Allergies    Problem List:    Patient Active Problem List   Diagnosis Code    Acute cholecystitis K81.0    Postoperative nausea and vomiting R11.2, Z98.890    Right nephrolithiasis N20.0       Past Medical History:        Diagnosis Date    Cholelithiasis     Diverticulitis     Hyperlipidemia        Past Surgical History:        Procedure Laterality Date   Twila De La Cruz  [id number]    w/cholangiogram    FOOT SURGERY      Right r/t fx    KNEE SURGERY      TONSILLECTOMY         Social History:    Social History     Tobacco Use    Smoking status: Never Smoker    Smokeless tobacco: Never Used   Substance Use Topics    Alcohol use: No                                Counseling given: Not Answered      Vital Signs (Current):   Vitals:    06/06/22 0330 06/06/22 0819 06/06/22 1130 06/06/22 1151   BP: 128/81 127/77 136/76 128/76   Pulse: 73 79 90 86   Resp: 16 16 18 16   Temp: 97.5 °F (36.4 °C) 97.8 °F (36.6 °C) 98.1 °F (36.7 °C) 98.8 °F (37.1 °C)   TempSrc: Oral Oral Oral Tympanic   SpO2: 97% 93% 93% 94%   Weight:       Height:    5' 8\" (1.727 m)                                              BP Readings from Last 3 Encounters:   06/06/22 128/76   10/25/18 111/76   03/18/17 (!) 160/89       NPO Status: Time of last liquid consumption: 1400                        Time of last solid consumption: 1400                        Date of last liquid consumption: 06/05/22                        Date of last solid food consumption: 06/05/22    BMI:   Wt Readings from Last 3 Encounters:   06/06/22 205 lb 9 oz (93.2 kg)   10/25/18 210 lb (95.3 kg)   03/18/17 210 lb (95.3 kg)     Body mass index is 31.26 kg/m². CBC:   Lab Results   Component Value Date    WBC 7.5 06/06/2022    RBC 4.84 06/06/2022    HGB 14.8 06/06/2022    HCT 43.8 06/06/2022    MCV 90.6 06/06/2022    RDW 12.9 06/06/2022     06/06/2022       CMP:   Lab Results   Component Value Date     06/06/2022    K 4.4 06/06/2022     06/06/2022    CO2 27 06/06/2022    BUN 17 06/06/2022    CREATININE 0.9 06/06/2022    GFRAA >60 06/06/2022    GFRAA 97 10/07/2011    AGRATIO 2.6 06/06/2022    LABGLOM >60 06/06/2022    GLUCOSE 91 06/06/2022    PROT 5.7 06/06/2022    PROT 5.6 10/07/2011    CALCIUM 8.9 06/06/2022    BILITOT 0.4 06/06/2022    ALKPHOS 48 06/06/2022    AST 23 06/06/2022    ALT 23 06/06/2022       POC Tests: No results for input(s): POCGLU, POCNA, POCK, POCCL, POCBUN, POCHEMO, POCHCT in the last 72 hours.     Coags:   Lab Results Component Value Date    PROTIME 11.4 03/18/2017    INR 0.98 03/18/2017    APTT 29.6 03/18/2017       HCG (If Applicable): No results found for: PREGTESTUR, PREGSERUM, HCG, HCGQUANT     ABGs: No results found for: PHART, PO2ART, HII6FYO, HPL3QLQ, BEART, [id number]     Type & Screen (If Applicable):  No results found for: LABABO, LABRH    Drug/Infectious Status (If Applicable):  No results found for: HIV, HEPCAB    COVID-19 Screening (If Applicable): No results found for: COVID19        Anesthesia Evaluation     history of anesthetic complications: PONV. Airway: Mallampati: II  TM distance: >3 FB   Neck ROM: full  Mouth opening: > = 3 FB   Dental:          Pulmonary:Negative Pulmonary ROS                              Cardiovascular:    (+) hyperlipidemia                  Neuro/Psych:   Negative Neuro/Psych ROS              GI/Hepatic/Renal:   (+) renal disease: kidney stones,          ROS comment: No nausea or pain. Endo/Other: Negative Endo/Other ROS                    Abdominal:             Vascular: negative vascular ROS. Other Findings:           Anesthesia Plan      general     ASA 2     (Risks, benefits and alternatives of GA discussed with pt. Questions answered. Willing to proceed.)  Induction: intravenous. Anesthetic plan and risks discussed with patient.                         Reji Cardona MD   6/6/2022

## 2022-06-06 NOTE — ANESTHESIA POSTPROCEDURE EVALUATION
Department of Anesthesiology  Postprocedure Note    Patient: Toma Oneill  MRN: [id number]  YOB: 1950  Date of evaluation: 6/6/2022  Time:  6:01 PM     Procedure Summary     Date: 06/06/22 Room / Location: [place] [address] 03 / [facility]    Anesthesia Start: 9629 Anesthesia Stop: 7990    Procedure: CYSTOSCOPY URETEROSCOPY WITH RIGHT RETROGRADE PYELOGRAM (Right Bladder) Diagnosis:       Calculi, ureter      (RIGHT URETERAL STONE)    Surgeons: Vini Jasso MD Responsible Provider: Pattricia Kayser, MD    Anesthesia Type: general ASA Status: 2          Anesthesia Type: No value filed. Walter Phase I: Walter Score: 10    Walter Phase II:      Last vitals: Reviewed and per EMR flowsheets. Anesthesia Post Evaluation    Patient location during evaluation: PACU  Patient participation: complete - patient participated  Level of consciousness: awake and alert  Airway patency: patent  Nausea & Vomiting: no nausea and no vomiting  Complications: no  Cardiovascular status: blood pressure returned to baseline  Respiratory status: acceptable  Hydration status: euvolemic  Comments: VSS on transfer to phase 2 recovery. No anesthetic complications.

## 2022-06-06 NOTE — CONSULTS
Urology Attending Consult Note      Reason for Consultation: back pain    History: 65 y/o male has a h/o stones and had back pain. CT shows a 5mm right ureteral stone. Family History, Social History, Review of Systems:  Reviewed and agreed to as per chart    Vitals:  /76   Pulse 86   Temp 98.8 °F (37.1 °C) (Tympanic)   Resp 16   Ht 5' 8\" (1.727 m)   Wt 205 lb 9 oz (93.2 kg)   SpO2 94%   BMI 31.26 kg/m²   Temp  Av.1 °F (36.7 °C)  Min: 97.5 °F (36.4 °C)  Max: 98.8 °F (37.1 °C)    Intake/Output Summary (Last 24 hours) at 2022 1215  Last data filed at 2022 1135  Gross per 24 hour   Intake 711.24 ml   Output 300 ml   Net 411.24 ml         Physical:   Well developed, well nourished in no acute distress   Mood indicates no abnormalities. Pt doesnt appear depressed   Orientated to time and place   Neck is supple, trachea is midline   Respiratory effort is normal   Cardiovascular show no extremity swelling   Abdomen no masses or hernias are palpated, there is no tenderness. Liver and Spleen appear normal.   Skin show no abnormal lesions   Lymph nodes are not palpated in the inguinal, neck, or axillary area. Labs:  WBC:    Lab Results   Component Value Date    WBC 7.5 2022     Hemoglobin/Hematocrit:    Lab Results   Component Value Date    HGB 14.8 2022    HCT 43.8 2022     BMP:    Lab Results   Component Value Date     2022    K 4.4 2022     2022    CO2 27 2022    BUN 17 2022    LABALBU 4.1 2022    CREATININE 0.9 2022    CALCIUM 8.9 2022    GFRAA >60 2022    GFRAA 97 10/07/2011    LABGLOM >60 2022     PT/INR:    Lab Results   Component Value Date    PROTIME 11.4 2017    INR 0.98 2017     PTT:    Lab Results   Component Value Date    APTT 29.6 2017   [APTT    Urinalysis: negtive    Imaging: CT-   1.  Calculus in the mid right ureter measuring 5 mm without hydroureteronephrosis. 2. No acute osseous abnormality of the lumbar spine. 3. Three-vessel coronary artery calcification and severe aortic valvular   calcification. Impression/Plan: right ureteral stone  1. To OR for right ureteroscopy to remove stone  2.  Possible d/c to home later today    Talib Macedo MD

## 2022-06-06 NOTE — PROGRESS NOTES
A: Telephone report given to [place], [place], all current drips, treatments, skin issues, and plan of care were reviewed by both RN's, patients care transferred with patient in stable condition, also telephone call to [address] [place] [address] to verify transmission of tele monitor which was confirmed.

## 2022-06-06 NOTE — PROGRESS NOTES
D: Patient is now awake and alert x 4, patient was able to push the oral airway with his tongue without difficulty, reviewed care plan with patient who agreed, call light is in reach.

## 2022-06-06 NOTE — ED PROVIDER NOTES
[address] ENCOUNTER        Pt Name: Kati Jiménez  MRN: [id number]  [place] 1950  Date of evaluation: 6/5/2022  Provider: Kacy Washington MD  PCP: Amarilys Rodriguez, [facility]       Chief Complaint   Patient presents with    Back Pain     \"back spasms been going on for a couple weeks\"; pain 83/70 \"with certain movement\"       HISTORY OFPRESENT ILLNESS   (Location/Symptom, Timing/Onset, Context/Setting, Quality, Duration, Modifying Factors,Severity)  Note limiting factors. Kati Jiménez is a 66 y.o. male presenting today due to concern for severe back spasms have been worsening over the last 2 to 3 weeks to the point where he is having difficulty walking and certain movements make the pain severe. He tried going to a chiropractor multiple times but this was not helping. He had an industrial accident back in 1973 and has had issues with intermittent back spasms ever since but this is the worst one. He denies any abdominal pain or vomiting. He denies any urinary complaints. He does have a history of a heart murmur. He denies any chest pain or shortness of breath. No recent falls or trauma. No radiation of the pain into the legs. No urinary incontinence or saddle anesthesia. No fevers or night sweats. Due to severe low back pain, he came to the ED for further evaluation. REVIEW OF SYSTEMS    (2-9 systems for level 4, 10 or more for level 5)     Review of Systems   Constitutional: Negative for chills, diaphoresis, fatigue and fever. HENT: Negative for congestion. Respiratory: Negative for shortness of breath. Cardiovascular: Negative for chest pain and leg swelling. Gastrointestinal: Negative for abdominal pain, diarrhea, nausea and vomiting. Genitourinary: Negative for difficulty urinating, dysuria and flank pain. Musculoskeletal: Positive for back pain (severe) and gait problem. Negative for neck pain.    Skin: Negative for wound (no falls or trauma). Neurological: Positive for weakness. Negative for syncope, numbness and headaches. Psychiatric/Behavioral: Negative for confusion. The patient is nervous/anxious. Positives and Pertinent negatives as per HPI. PASTMEDICAL HISTORY     Past Medical History:   Diagnosis Date    Cholelithiasis     Diverticulitis     Hyperlipidemia          SURGICAL HISTORY       Past Surgical History:   Procedure Laterality Date   Diogenes Hanson  [id number]    w/cholangiogram    FOOT SURGERY      Right r/t fx    KNEE SURGERY      TONSILLECTOMY           CURRENT MEDICATIONS       Current Discharge Medication List      CONTINUE these medications which have NOT CHANGED    Details   !! methocarbamol (ROBAXIN) 500 MG tablet Take 500 mg by mouth 3 times daily as needed      !! methocarbamol (ROBAXIN) 500 MG tablet PLEASE SEE ATTACHED FOR DETAILED DIRECTIONS      Coenzyme Q10 50 MG CAPS Take 50 mg by mouth daily      azithromycin (ZITHROMAX) 250 MG tablet TAKE 2 TABLETS BY MOUTH TODAY, THEN TAKE 1 TABLET DAILY FOR 4 DAYS      tamsulosin (FLOMAX) 0.4 MG capsule       GARLIC PO Take by mouth      KRILL OIL PO Take by mouth      NONFORMULARY Indications: cq 10      ranitidine (ZANTAC) 150 MG tablet Take 150 mg by mouth 2 times daily      aspirin 81 MG EC tablet Take 1 tablet by mouth daily. Qty: 30 tablet, Refills: 0      therapeutic multivitamin-minerals (THERAGRAN-M) tablet Take 1 tablet by mouth daily. atorvastatin (LIPITOR) 20 MG tablet Take 20 mg by mouth daily. !! - Potential duplicate medications found. Please discuss with provider. ALLERGIES     Patient has no known allergies. FAMILY HISTORY     History reviewed. No pertinent family history.        SOCIAL HISTORY       Social History     Socioeconomic History    Marital status:      Spouse name: None    Number of children: None    Years of education: None    Highest education level: None   Occupational History    None   Tobacco Use    Smoking status: Never Smoker    Smokeless tobacco: Never Used   Substance and Sexual Activity    Alcohol use: No    Drug use: No    Sexual activity: None   Other Topics Concern    None   Social History Narrative    None     Social Determinants of Health     Financial Resource Strain:     Difficulty of Paying Living Expenses: Not on file   Food Insecurity:     Worried About Running Out of Food in the Last Year: Not on file    Ameya of Food in the Last Year: Not on file   Transportation Needs:     Lack of Transportation (Medical): Not on file    Lack of Transportation (Non-Medical):  Not on file   Physical Activity:     Days of Exercise per Week: Not on file    Minutes of Exercise per Session: Not on file   Stress:     Feeling of Stress : Not on file   Social Connections:     Frequency of Communication with Friends and Family: Not on file    Frequency of Social Gatherings with Friends and Family: Not on file    Attends Hindu Services: Not on file    Active Member of [address] [place] [address] or Organizations: Not on file    Attends Club or Organization Meetings: Not on file    Marital Status: Not on file   Intimate Partner Violence:     Fear of Current or Ex-Partner: Not on file    Emotionally Abused: Not on file    Physically Abused: Not on file    Sexually Abused: Not on file   Housing Stability:     Unable to Pay for Housing in the Last Year: Not on file    Number of [place] in the Last Year: Not on file    Unstable Housing in the Last Year: Not on file       SCREENINGS    [place] Coma Scale  Eye Opening: Spontaneous  Best Verbal Response: Oriented  Best Motor Response: Obeys commands  [place] Coma Scale Score: 15           PHYSICAL EXAM    (up to 7 for level 4, 8 or more for level 5)     ED Triage Vitals [06/05/22 2053]   BP Temp Temp Source Heart Rate Resp SpO2 Height Weight   127/75 98.4 °F (36.9 °C) Oral 81 20 95 % 5' 8\" (1.727 m) 200 lb (90.7 kg)       Physical Exam  Vitals and nursing note reviewed. Constitutional:       General: He is awake. He is in acute distress (mild). Appearance: Normal appearance. He is well-developed and well-groomed. He is obese. He is not ill-appearing, toxic-appearing or diaphoretic. HENT:      Head: Normocephalic and atraumatic. Right Ear: External ear normal.      Left Ear: External ear normal.      Nose: Nose normal.      Mouth/Throat:      Mouth: Mucous membranes are moist.      Pharynx: No oropharyngeal exudate. Eyes:      General: No scleral icterus. Right eye: No discharge. Left eye: No discharge. Neck:      Trachea: Trachea normal. No tracheal deviation. Cardiovascular:      Rate and Rhythm: Normal rate and regular rhythm. Pulses: Normal pulses. Heart sounds: Murmur heard. Pulmonary:      Effort: Pulmonary effort is normal. No accessory muscle usage or respiratory distress. Breath sounds: Normal breath sounds. No stridor. No decreased breath sounds, wheezing, rhonchi or rales. Chest:      Chest wall: No tenderness. Abdominal:      General: Abdomen is flat. Bowel sounds are normal. There is no distension. Palpations: Abdomen is soft. Tenderness: There is no abdominal tenderness. There is no right CVA tenderness, left CVA tenderness, guarding or rebound. Negative signs include Bassett's sign and McBurney's sign. Musculoskeletal:         General: No swelling, deformity or signs of injury. Normal range of motion. Cervical back: Full passive range of motion without pain, normal range of motion and neck supple. No edema, erythema, rigidity, tenderness or bony tenderness. Normal range of motion. Thoracic back: No lacerations, tenderness or bony tenderness. Normal range of motion. Lumbar back: Tenderness and bony tenderness present. Back:       Right lower leg: No edema. Left lower leg: No edema.    Skin: General: Skin is warm and dry. Coloration: Skin is not jaundiced or pale. Findings: No bruising, erythema or rash. Neurological:      General: No focal deficit present. Mental Status: He is alert and oriented to person, place, and time. Mental status is at baseline. GCS: GCS eye subscore is 4. GCS verbal subscore is 5. GCS motor subscore is 6. Cranial Nerves: No dysarthria or facial asymmetry. Sensory: Sensation is intact. No sensory deficit. Motor: Motor function is intact. No weakness, tremor, atrophy, abnormal muscle tone or seizure activity. Deep Tendon Reflexes:      Reflex Scores:       Patellar reflexes are 1+ on the right side and 1+ on the left side. Comments: Reflexes diminished but he does seem to be in significant pain and therefore was somewhat guarded during exam    Able to ambulate with a walker but obvious pain during this and therefore was struggling at times to go to the bathroom    Normal hip extension/flexion, knee extension/flexion, ankle dorsiflexion/plantarflexion and great toe dorsiflexion/plantarflexion with strength 5 out of 5    Normal sensation to bilateral lower extremities, no saddle anesthesia   Psychiatric:         Attention and Perception: Attention normal.         Mood and Affect: Affect normal. Mood is anxious. Mood is not depressed. Speech: Speech normal. Speech is not slurred. Behavior: Behavior normal. Behavior is cooperative. DIAGNOSTIC RESULTS   :    Labs Reviewed   COMPREHENSIVE METABOLIC PANEL - Abnormal; Notable for the following components:       Result Value    Glucose 144 (*)     All other components within normal limits   COMPREHENSIVE METABOLIC PANEL W/ REFLEX TO MG FOR LOW K - Abnormal; Notable for the following components:     Total Protein 5.7 (*)     Albumin/Globulin Ratio 2.6 (*)     All other components within normal limits   COVID-19, RAPID   CBC WITH AUTO DIFFERENTIAL   LIPASE   MAGNESIUM URINALYSIS WITH REFLEX TO CULTURE   CBC WITH AUTO DIFFERENTIAL   CBC WITH AUTO DIFFERENTIAL   COMPREHENSIVE METABOLIC PANEL W/ REFLEX TO MG FOR LOW K   POCT GLUCOSE       All other labs were within normal range or not returned asof this dictation. EKG: All EKG's are interpreted by the Emergency Department Physician who either signs or Co-signs this chart in the absence of a cardiologist.        RADIOLOGY:   Non-plain film images such as CT, Ultrasound and MRI are read by the radiologist. Malen Rocker images are visualized and preliminarily interpreted by the  ED Provider with the belowfindings:        Interpretation per the Radiologist below, if available at the time of this note:    XR ABDOMEN (KUB) (SINGLE AP VIEW)   Final Result   Intraprocedural fluoroscopic spot images as above. See separate procedure   report for more information. FLUORO FOR SURGICAL PROCEDURES   Final Result      CT ABDOMEN PELVIS W IV CONTRAST Additional Contrast? None   Final Result   1. Calculus in the mid right ureter measuring 5 mm without   hydroureteronephrosis. 2. No acute osseous abnormality of the lumbar spine. 3. Three-vessel coronary artery calcification and severe aortic valvular   calcification. CT LUMBAR SPINE TRAUMA RECONSTRUCTION   Final Result   1. Calculus in the mid right ureter measuring 5 mm without   hydroureteronephrosis. 2. No acute osseous abnormality of the lumbar spine. 3. Three-vessel coronary artery calcification and severe aortic valvular   calcification. PROCEDURES   Unless otherwise noted below, none     Procedures    CRITICAL CARE TIME   N/A    CONSULTS: Paged hospitalist for admission.   IP CONSULT TO HOSPITALIST  IP CONSULT TO UROLOGY    EMERGENCY DEPARTMENT COURSE and DIFFERENTIAL DIAGNOSIS/MDM:   Vitals:    Vitals:    06/06/22 1410 06/06/22 1423 06/06/22 1541 06/06/22 1606   BP:  135/78  119/74   Pulse: 78 77  76   Resp:  18 24 18   Temp:  97.4 °F (36.3 °C) 97.9 °F (36.6 °C)   TempSrc:  Oral  Oral   SpO2: 97% 96%  94%   Weight:       Height:           Patient was given the following medications:  Medications   morphine sulfate (PF) injection 4 mg (4 mg IntraVENous Not Given 6/6/22 0206)   oxyCODONE (ROXICODONE) immediate release tablet 5 mg (5 mg Oral Given 6/6/22 1511)   oxyCODONE (ROXICODONE) immediate release tablet 10 mg (has no administration in time range)   0.9 % sodium chloride infusion (0 mLs IntraVENous Stopped 6/6/22 1424)   sodium chloride flush 0.9 % injection 10 mL (10 mLs IntraVENous Not Given 6/6/22 0829)   sodium chloride flush 0.9 % injection 10 mL (has no administration in time range)   0.9 % sodium chloride infusion (has no administration in time range)   enoxaparin (LOVENOX) injection 40 mg (40 mg SubCUTAneous Not Given 6/6/22 1012)   promethazine (PHENERGAN) tablet 12.5 mg ( Oral See Alternative 6/6/22 1233)     Or   ondansetron (ZOFRAN) injection 4 mg (4 mg IntraVENous Given 6/6/22 1233)   acetaminophen (TYLENOL) tablet 650 mg (has no administration in time range)     Or   acetaminophen (TYLENOL) suppository 650 mg (has no administration in time range)   morphine sulfate (PF) injection 4 mg (has no administration in time range)   tamsulosin (FLOMAX) capsule 0.4 mg (0.4 mg Oral Given 6/6/22 0419)   atorvastatin (LIPITOR) tablet 20 mg (0 mg Oral Held 6/6/22 1011)   aspirin EC tablet 81 mg (81 mg Oral Not Given 6/6/22 1011)   methocarbamol (ROBAXIN) tablet 500 mg (has no administration in time range)   famotidine (PEPCID) tablet 20 mg (0 mg Oral Held 6/6/22 1011)   ceFAZolin (ANCEF) 2000 mg in dextrose 5 % 100 mL IVPB (has no administration in time range)   ondansetron (ZOFRAN) injection 4 mg (4 mg IntraVENous Given 6/5/22 2324)   diazePAM (VALIUM) tablet 5 mg (5 mg Oral Given 6/5/22 2325)   HYDROcodone-acetaminophen (NORCO) 5-325 MG per tablet 1 tablet (1 tablet Oral Given 6/5/22 8629)   0.9 % sodium chloride bolus (0 mLs IntraVENous Stopped 6/5/22 0515)   iopamidol (ISOVUE-370) 76 % injection 75 mL (75 mLs IntraVENous Given 6/6/22 0020)   ketorolac (TORADOL) injection 15 mg (15 mg IntraVENous Given 6/6/22 0155)   HYDROmorphone (DILAUDID) 1 MG/ML injection (0.5 mg  Given 6/6/22 1409)     Patient was evaluated having severe pain over the last couple of weeks much worse than typical back spasms to the point where he is having trouble ambulating. He denied any abdominal pain or urinary complaints but CT was obtained showing concern for obstructing right ureteral stone that could explain his pain. I did reevaluate him after being medicated and he was still in obvious distress and had difficulty walking to the bathroom. Therefore, I do believe he would benefit from further evaluation in the hospital with urology consultation along with PT/OT assessment. He denies any chest pain or shortness of breath and story not suggestive of acute coronary syndrome. He is stable for the floor and agreed with this plan. He may ultimately need an MRI of the lumbar spine if pain persists. He had no focal neurodeficits at this time and therefore this time I have very low suspicion for cauda equina syndrome. He had good strength and sensation and at this time I do not suspect Guillain-Barré syndrome. The patient tolerated their visit well. The patient and / or the family were informed of the results of any tests, a time was given to answer questions. FINAL IMPRESSION      1. Right ureteral stone    2. Acute bilateral low back pain without sciatica    3. Trouble walking    4. Coronary artery calcification seen on CAT scan          DISPOSITION/PLAN   DISPOSITION Admitted 06/06/2022 01:55:11 AM      PATIENT REFERRED TO:  No follow-up provider specified.     DISCHARGEMEDICATIONS:  Current Discharge Medication List          DISCONTINUED MEDICATIONS:  Current Discharge Medication List                 (Please note that portions of this note were completed with a voicerecognition program.  Efforts were made to edit the dictations but occasionally words are mis-transcribed.)    Darien Shirley MD (electronically signed)           Darien Shirley MD  06/06/22 9101

## 2022-06-06 NOTE — PLAN OF CARE
Hospitalist Plan of Care Note      PCP: Lavina Halsted, MD    Date of Admission: 6/5/2022    Chief Complaint: Back Pain    Subjective: no new c/o. Medications:  Reviewed    Infusion Medications    sodium chloride 100 mL/hr at 06/06/22 1054    sodium chloride       Scheduled Medications    morphine  4 mg IntraVENous Once    sodium chloride flush  10 mL IntraVENous 2 times per day    enoxaparin  40 mg SubCUTAneous Daily    tamsulosin  0.4 mg Oral Daily    atorvastatin  20 mg Oral Daily    aspirin  81 mg Oral Daily    famotidine  20 mg Oral BID     PRN Meds: oxyCODONE, oxyCODONE, sodium chloride flush, sodium chloride, promethazine **OR** ondansetron, acetaminophen **OR** acetaminophen, morphine, methocarbamol      Intake/Output Summary (Last 24 hours) at 6/6/2022 1111  Last data filed at 6/6/2022 1025  Gross per 24 hour   Intake 597.63 ml   Output 300 ml   Net 297.63 ml       Physical Exam NOT Performed:    /77   Pulse 79   Temp 97.8 °F (36.6 °C) (Oral)   Resp 16   Ht 5' 8\" (1.727 m)   Wt 205 lb 9 oz (93.2 kg)   SpO2 93%   BMI 31.26 kg/m²       Labs:   Recent Labs     06/05/22 2320 06/06/22  0723   WBC 8.8 7.5   HGB 15.7 14.8   HCT 46.2 43.8    200     Recent Labs     06/05/22 2320 06/06/22  0723    143   K 3.7 4.4    106   CO2 24 27   BUN 17 17   CREATININE 0.8 0.9   CALCIUM 9.3 8.9     Recent Labs     06/05/22 2320 06/06/22  0723   AST 26 23   ALT 29 23   BILITOT <0.2 0.4   ALKPHOS 55 48     No results for input(s): INR in the last 72 hours. No results for input(s): Sue [place] in the last 72 hours.     Urinalysis:      Lab Results   Component Value Date    NITRU Negative 06/05/2022    BLOODU Negative 06/05/2022    SPECGRAV >=1.030 06/05/2022    GLUCOSEU Negative 06/05/2022    GLUCOSEU Negative 09/04/2011       Consults:    IP CONSULT TO HOSPITALIST  IP CONSULT TO UROLOGY      Assessment/Plan:    Active Hospital Problems    Diagnosis     Right nephrolithiasis [N20.0]      Priority: Medium         Acute right nephrolithiasis  Flomax initiated  Urology consulted, much appreciated     GERD: Continue home medication  Hyperlipidemia: Controlled on home Statin. Outpatient PCP follow up post-discharge  Class I obesity:Complicating assessment and treatment. Placing patient at risk for multiple co-morbidities as well as early death and contributing to the patient's presentation. Education, and counseling         DVT Prophylaxis: LMWH     Recent Labs     06/05/22  2320 06/06/22  0723    200     Diet: Diet NPO Exceptions are: Ice Chips, Sips of Water with Meds  Code Status: Full Code      PT/OT Eval Status: not yet ordered. Dispo - Patient is likely to remain in-house at least until Tues/Wed 7/8 June pending clinical course and subspecialty recs.        Riky Robertson MD

## 2022-06-06 NOTE — CARE COORDINATION
CASE MANAGEMENT INITIAL ASSESSMENT      Reviewed chart and completed assessment with patient:yes  Family present: no  Explained Case Management role/services. yes    Primary contact information:kari Scott     Health Care Decision Maker :   Primary Decision Maker: Willam Gagnon - [phone]          Can this person be reached and be able to respond quickly, such as within a few minutes or hours? Yes      Admit date/status:6/6/22  Diagnosis:Right nephrolithiasis   Is this a Readmission?:  No      Insurance:[employer]et Medicare    Precert required for SNF: Yes       3 night stay required: No    Living arrangements, Adls, care needs, prior to admission:From home alone, independent in ADLs    Durable Medical Equipment at home:  Walker__Cane__RTS__ BSC__Shower Chair__  02__ HHN__ CPAP__  BiPap__  Hospital Bed__ W/C___ Other_____    Services in the home and/or outpatient, prior to admission:none    Current PCP:Shruthi Tobias MD                                Medications:yes Prescription coverage? Yes Will pt require financial assistance with medications No     Transportation needs: car     Dialysis Facility (if applicable)   · Name:  · Address:  · Dialysis Schedule:  · Phone:  · Fax:    PT/OT recs:    Hospital Exemption Notification (HEN):    Barriers to discharge:medical complications     Plan/comments:Referred to patient for discharge planning. Patient is a 66year old male presenting to [facility] with Right nephrolithiasis. Patient lives at home alone and is independent in ADLs. Patient reports he has no needs at this time.  Electronically signed by JOHNNY Joseph Student on 6/6/2022 at 9:42 AM  Electronically signed by JOHNNY Lopez-S on 6/6/2022 at 10:06 AM   ECOC on chart for MD signature

## 2022-06-07 VITALS
OXYGEN SATURATION: 93 % | HEIGHT: 68 IN | BODY MASS INDEX: 31.15 KG/M2 | DIASTOLIC BLOOD PRESSURE: 91 MMHG | HEART RATE: 87 BPM | TEMPERATURE: 98.1 F | SYSTOLIC BLOOD PRESSURE: 170 MMHG | RESPIRATION RATE: 17 BRPM | WEIGHT: 205.56 LBS

## 2022-06-07 LAB
A/G RATIO: 1.7 (ref 1.1–2.2)
ALBUMIN SERPL-MCNC: 4.3 G/DL (ref 3.4–5)
ALP BLD-CCNC: 54 U/L (ref 40–129)
ALT SERPL-CCNC: 17 U/L (ref 10–40)
ANION GAP SERPL CALCULATED.3IONS-SCNC: 11 MMOL/L (ref 3–16)
AST SERPL-CCNC: 21 U/L (ref 15–37)
BASOPHILS ABSOLUTE: 0 K/UL (ref 0–0.2)
BASOPHILS RELATIVE PERCENT: 0.3 %
BILIRUB SERPL-MCNC: 0.4 MG/DL (ref 0–1)
BUN BLDV-MCNC: 16 MG/DL (ref 7–20)
CALCIUM SERPL-MCNC: 9.1 MG/DL (ref 8.3–10.6)
CHLORIDE BLD-SCNC: 106 MMOL/L (ref 99–110)
CO2: 26 MMOL/L (ref 21–32)
CREAT SERPL-MCNC: 1.1 MG/DL (ref 0.8–1.3)
EOSINOPHILS ABSOLUTE: 0.1 K/UL (ref 0–0.6)
EOSINOPHILS RELATIVE PERCENT: 0.8 %
GFR AFRICAN AMERICAN: >60
GFR NON-AFRICAN AMERICAN: >60
GLUCOSE BLD-MCNC: 154 MG/DL (ref 70–99)
HCT VFR BLD CALC: 44.5 % (ref 40.5–52.5)
HEMOGLOBIN: 15.1 G/DL (ref 13.5–17.5)
LYMPHOCYTES ABSOLUTE: 1 K/UL (ref 1–5.1)
LYMPHOCYTES RELATIVE PERCENT: 7.6 %
MCH RBC QN AUTO: 30.7 PG (ref 26–34)
MCHC RBC AUTO-ENTMCNC: 33.9 G/DL (ref 31–36)
MCV RBC AUTO: 90.7 FL (ref 80–100)
MONOCYTES ABSOLUTE: 0.8 K/UL (ref 0–1.3)
MONOCYTES RELATIVE PERCENT: 5.7 %
NEUTROPHILS ABSOLUTE: 11.4 K/UL (ref 1.7–7.7)
NEUTROPHILS RELATIVE PERCENT: 85.6 %
PDW BLD-RTO: 12.5 % (ref 12.4–15.4)
PLATELET # BLD: 226 K/UL (ref 135–450)
PMV BLD AUTO: 7.9 FL (ref 5–10.5)
POTASSIUM REFLEX MAGNESIUM: 3.8 MMOL/L (ref 3.5–5.1)
RBC # BLD: 4.91 M/UL (ref 4.2–5.9)
SODIUM BLD-SCNC: 143 MMOL/L (ref 136–145)
TOTAL PROTEIN: 6.9 G/DL (ref 6.4–8.2)
WBC # BLD: 13.3 K/UL (ref 4–11)

## 2022-06-07 PROCEDURE — 2580000003 HC RX 258: Performed by: UROLOGY

## 2022-06-07 PROCEDURE — 6370000000 HC RX 637 (ALT 250 FOR IP): Performed by: UROLOGY

## 2022-06-07 PROCEDURE — 85025 COMPLETE CBC W/AUTO DIFF WBC: CPT

## 2022-06-07 PROCEDURE — 6360000002 HC RX W HCPCS: Performed by: UROLOGY

## 2022-06-07 PROCEDURE — 80053 COMPREHEN METABOLIC PANEL: CPT

## 2022-06-07 PROCEDURE — 96376 TX/PRO/DX INJ SAME DRUG ADON: CPT

## 2022-06-07 PROCEDURE — 96372 THER/PROPH/DIAG INJ SC/IM: CPT

## 2022-06-07 PROCEDURE — 36415 COLL VENOUS BLD VENIPUNCTURE: CPT

## 2022-06-07 PROCEDURE — 96366 THER/PROPH/DIAG IV INF ADDON: CPT

## 2022-06-07 PROCEDURE — G0378 HOSPITAL OBSERVATION PER HR: HCPCS

## 2022-06-07 PROCEDURE — 96367 TX/PROPH/DG ADDL SEQ IV INF: CPT

## 2022-06-07 RX ORDER — TAMSULOSIN HYDROCHLORIDE 0.4 MG/1
0.4 CAPSULE ORAL DAILY
Qty: 30 CAPSULE | Refills: 0 | Status: SHIPPED | OUTPATIENT
Start: 2022-06-08 | End: 2022-07-08

## 2022-06-07 RX ORDER — OXYCODONE HYDROCHLORIDE 5 MG/1
5 TABLET ORAL EVERY 6 HOURS PRN
Qty: 10 TABLET | Refills: 0 | Status: SHIPPED | OUTPATIENT
Start: 2022-06-07 | End: 2022-06-10

## 2022-06-07 RX ADMIN — OXYCODONE 5 MG: 5 TABLET ORAL at 12:58

## 2022-06-07 RX ADMIN — ENOXAPARIN SODIUM 40 MG: 40 INJECTION SUBCUTANEOUS at 09:19

## 2022-06-07 RX ADMIN — CEFAZOLIN 2000 MG: 10 INJECTION, POWDER, FOR SOLUTION INTRAVENOUS at 04:41

## 2022-06-07 RX ADMIN — OXYCODONE 10 MG: 5 TABLET ORAL at 04:51

## 2022-06-07 RX ADMIN — ASPIRIN 81 MG: 81 TABLET, COATED ORAL at 09:18

## 2022-06-07 RX ADMIN — METHOCARBAMOL 500 MG: 500 TABLET ORAL at 05:00

## 2022-06-07 RX ADMIN — TAMSULOSIN HYDROCHLORIDE 0.4 MG: 0.4 CAPSULE ORAL at 09:19

## 2022-06-07 RX ADMIN — ATORVASTATIN CALCIUM 20 MG: 10 TABLET, FILM COATED ORAL at 09:19

## 2022-06-07 RX ADMIN — ONDANSETRON 4 MG: 2 INJECTION INTRAMUSCULAR; INTRAVENOUS at 06:24

## 2022-06-07 RX ADMIN — FAMOTIDINE 20 MG: 20 TABLET ORAL at 09:19

## 2022-06-07 ASSESSMENT — PAIN DESCRIPTION - LOCATION
LOCATION: PENIS
LOCATION: BACK
LOCATION: BACK

## 2022-06-07 ASSESSMENT — PAIN SCALES - GENERAL
PAINLEVEL_OUTOF10: 5
PAINLEVEL_OUTOF10: 5
PAINLEVEL_OUTOF10: 10

## 2022-06-07 ASSESSMENT — PAIN DESCRIPTION - DESCRIPTORS
DESCRIPTORS: SHARP
DESCRIPTORS: SHARP;STABBING

## 2022-06-07 ASSESSMENT — PAIN DESCRIPTION - ORIENTATION: ORIENTATION: LEFT

## 2022-06-07 NOTE — PLAN OF CARE
Problem: Pain  Goal: Verbalizes/displays adequate comfort level or baseline comfort level  Note: PT with lower left back pain. PT given PRN PO pain medication and Robaxin. PT ambulated to restroom, pt moved to recliner with pillow support per pt request.  PT offered cold/heat pack. PT is urinating without difficulty throughout this shift. Urine is yellow in color, no bleeding or clots. PT is now resting comfortably in chair, pt dosing off, speaking to several different family members. PT provided cold wash cloths and offered Zofran. Call light/cell phone in reach.

## 2022-06-07 NOTE — OP NOTE
[facility]                                OPERATIVE REPORT    PATIENT NAME: Ella Marques                        :        1950  MED REC NO:   [id number]                          ROOM:       0116  ACCOUNT NO:   [de-identified]                           ADMIT DATE: 2022  PROVIDER:     Kiara Chang. Elida Perez MD    DATE OF PROCEDURE:  2022    LOCATION:  [facility].    PREOPERATIVE DIAGNOSIS:  Right ureteral stone. POSTOPERATIVE DIAGNOSIS:  Right ureteral stone. OPERATION PERFORMED:  Right diagnostic ureteroscopy, right retrograde  pyelogram.    SURGEON:  Ravin Perez MD    INDICATIONS FOR PROCEDURE:  The patient is a 69-year-old male who had  back pain and CT showed a 5 mm right mid ureteral stone. The risks and  benefits of right ureteroscopy and indicated procedures were discussed  with the patient. He agreed to proceed. DESCRIPTION OF PROCEDURE:  The patient had preoperative antibiotics,  general anesthesia, was in the lithotomy position. Genitalia were  prepped and draped in the usual sterile fashion. A 21-French rigid  cystoscope was inserted into the patient's urethra. The patient has  trilobar hypertrophy. I found the right ureteric orifice and placed the  ZIPwire up under fluoroscopic guidance up into the right kidney. I then  took a rigid ureteroscope and went all the way up to the UPJ, did not  see any stone. I injected contrast, went freely up to the right kidney  and drained promptly. I then removed the wire, emptied his bladder. He  was awoken, sent to recovery room in good condition, tolerated the  procedure well. PLAN:  He can be discharged later today or tomorrow and follow up as  needed. ESTIMATED BLOOD LOSS:  0 mL.         Kaden Pimentel MD    D: 2022 13:18:11       T: 2022 20:39:01     [id number]  Job#: [id number]     Doc#: [id number]    CC:

## 2022-06-07 NOTE — PROGRESS NOTES
Hospitalist Progress Note      PCP: Khushi Vizcaino MD    Date of Admission: 6/5/2022    Chief Complaint: Back Pain    Subjective: no new c/o. Medications:  Reviewed    Infusion Medications    sodium chloride       Scheduled Medications    morphine  4 mg IntraVENous Once    sodium chloride flush  10 mL IntraVENous 2 times per day    enoxaparin  40 mg SubCUTAneous Daily    tamsulosin  0.4 mg Oral Daily    atorvastatin  20 mg Oral Daily    aspirin  81 mg Oral Daily    famotidine  20 mg Oral BID    ceFAZolin  2,000 mg IntraVENous Q8H     PRN Meds: oxyCODONE, oxyCODONE, sodium chloride flush, sodium chloride, promethazine **OR** ondansetron, acetaminophen **OR** acetaminophen, morphine, methocarbamol      Intake/Output Summary (Last 24 hours) at 6/7/2022 1022  Last data filed at 6/6/2022 2300  Gross per 24 hour   Intake 1531.24 ml   Output 1025 ml   Net 506.24 ml       Physical Exam Performed:    /66   Pulse 77   Temp 98.4 °F (36.9 °C) (Oral)   Resp 17   Ht 5' 8\" (1.727 m)   Wt 205 lb 9 oz (93.2 kg)   SpO2 93%   BMI 31.26 kg/m²     General appearance: No apparent distress, appears stated age and cooperative. HEENT: Pupils equal, round, and reactive to light. Conjunctivae/corneas clear. Neck: Supple, with full range of motion. No jugular venous distention. Trachea midline. Respiratory:  Normal respiratory effort. Clear to auscultation, bilaterally without Rales/Wheezes/Rhonchi. Cardiovascular: Regular rate and rhythm with normal S1/S2 without murmurs, rubs or gallops. Abdomen: Soft, non-tender, non-distended with normal bowel sounds. Musculoskeletal: No clubbing, cyanosis or edema bilaterally. Full range of motion without deformity. Skin: Skin color, texture, turgor normal.  No rashes or lesions. Neurologic:  Neurovascularly intact without any focal sensory/motor deficits.  Cranial nerves: II-XII intact, grossly non-focal.  Psychiatric: Alert and oriented, thought content appropriate, normal insight  Capillary Refill: Brisk,< 3 seconds   Peripheral Pulses: +2 palpable, equal bilaterally       Labs:   Recent Labs     06/05/22 2320 06/06/22 0723 06/07/22  0633   WBC 8.8 7.5 13.3*   HGB 15.7 14.8 15.1   HCT 46.2 43.8 44.5    200 226     Recent Labs     06/05/22 2320 06/06/22  0723 06/07/22  0633    143 143   K 3.7 4.4 3.8    106 106   CO2 24 27 26   BUN 17 17 16   CREATININE 0.8 0.9 1.1   CALCIUM 9.3 8.9 9.1     Recent Labs     06/05/22 2320 06/06/22 0723 06/07/22  0633   AST 26 23 21   ALT 29 23 17   BILITOT <0.2 0.4 0.4   ALKPHOS 55 48 54     No results for input(s): INR in the last 72 hours. No results for input(s): [employer] in the last 72 hours. Urinalysis:      Lab Results   Component Value Date    NITRU Negative 06/05/2022    BLOODU Negative 06/05/2022    SPECGRAV >=1.030 06/05/2022    GLUCOSEU Negative 06/05/2022    GLUCOSEU Negative 09/04/2011       Consults:    IP CONSULT TO HOSPITALIST  IP CONSULT TO UROLOGY      Assessment/Plan:    Active Hospital Problems    Diagnosis     Right nephrolithiasis [N20.0]      Priority: Medium    Coronary artery calcification seen on CAT scan [I25.10]      Priority: Medium       Nephrolithiasis - demonstrated on admission CT scan. Urology consulted and appreciated w/ subsequent cysto negative for obstructing stone w/ no stent placed. Complicated by post-procedural pain/bleeding so monitored o/night. HyperLipidemia - controlled on home Statin. Continue, w/ f/u and med adjustment w/ PCP    GERD - w/out active signs/sxs of dysphagia/odynophagia. No evidence of active PUD or hx of GI bleed. Controlled on home PPI - continue. CAD - asymptomatic but w/ coronary calcifications seen on CT scan. Outpt PCP f/u for possible outpt Cardiology referral.     Obesity -  With Body mass index is 31.26 kg/m². Complicating assessment and treatment.  Placing patient at risk for multiple co-morbidities as well as early death and contributing to the patient's presentation. Counseled on weight loss.       DVT Prophylaxis: LMWH    Recent Labs     06/05/22  2320 06/06/22  0723 06/07/22  0633    200 226     Diet: ADULT DIET; Regular  Code Status: Full Code      PT/OT Eval Status: not yet ordered. Dispo - Patient is likely to remain in-house at least until Tues/Wed 7/8 June pending clinical course and subspecialty recs.     Sadie Rojo MD

## 2022-06-07 NOTE — DISCHARGE SUMMARY
Hospital Medicine Discharge Summary    Patient ID: Oswaldo Felix      Patient's PCP: Urbano Quiroz MD    Admit Date: 6/5/2022     Discharge Date: 6/7/2022      Admitting Physician: Collins Bustillo DO     Discharge Physician: Fang Wilde MD     Discharge Diagnoses: Active Hospital Problems    Diagnosis     Right nephrolithiasis [N20.0]      Priority: Medium    Coronary artery calcification seen on CAT scan [I25.10]      Priority: Medium       The patient was seen and examined on day of discharge and this discharge summary is in conjunction with any daily progress note from day of discharge. Hospital Course:        Nephrolithiasis - demonstrated on admission CT scan. Urology consulted and appreciated w/ subsequent cysto negative for obstructing stone w/ no stent placed. Complicated by post-procedural pain/bleeding so monitored o/night and resolved.      HyperLipidemia - controlled on home Statin. Continue, w/ f/u and med adjustment w/ PCP     GERD - w/out active signs/sxs of dysphagia/odynophagia. No evidence of active PUD or hx of GI bleed. Controlled on home PPI - continue.     CAD - asymptomatic but w/ coronary calcifications seen on CT scan. Outpt PCP f/u for possible outpt Cardiology referral.      Obesity -  With Body mass index is 31.26 kg/m². Complicating assessment and treatment. Placing patient at risk for multiple co-morbidities as well as early death and contributing to the patient's presentation. Counseled on weight loss.       Labs:  For convenience and continuity at follow-up the following most recent labs are provided:      CBC:    Lab Results   Component Value Date    WBC 13.3 06/07/2022    HGB 15.1 06/07/2022    HCT 44.5 06/07/2022     06/07/2022       Renal:    Lab Results   Component Value Date     06/07/2022    K 3.8 06/07/2022     06/07/2022    CO2 26 06/07/2022    BUN 16 06/07/2022    CREATININE 1.1 06/07/2022    CALCIUM 9.1 06/07/2022    PHOS 2.0 10/07/2011         Significant Diagnostic Studies    Radiology:   XR ABDOMEN (KUB) (SINGLE AP VIEW)   Final Result   Intraprocedural fluoroscopic spot images as above. See separate procedure   report for more information. FLUORO FOR SURGICAL PROCEDURES   Final Result      CT ABDOMEN PELVIS W IV CONTRAST Additional Contrast? None   Final Result   1. Calculus in the mid right ureter measuring 5 mm without   hydroureteronephrosis. 2. No acute osseous abnormality of the lumbar spine. 3. Three-vessel coronary artery calcification and severe aortic valvular   calcification. CT LUMBAR SPINE TRAUMA RECONSTRUCTION   Final Result   1. Calculus in the mid right ureter measuring 5 mm without   hydroureteronephrosis. 2. No acute osseous abnormality of the lumbar spine. 3. Three-vessel coronary artery calcification and severe aortic valvular   calcification. Consults:     IP CONSULT TO HOSPITALIST  IP CONSULT TO UROLOGY    Disposition: Home     Condition at Discharge: Stable    Discharge Instructions/Follow-up:  w/ PCP 1-2 weeks and subspecialists as arranged. Code Status:  Full Code    Activity: activity as tolerated    Diet: regular diet      Discharge Medications:     Discharge Medication List as of 6/7/2022  1:13 PM           Details   oxyCODONE (ROXICODONE) 5 MG immediate release tablet Take 1 tablet by mouth every 6 hours as needed for Pain for up to 3 days. , Disp-10 tablet, R-0Print              Details   tamsulosin (FLOMAX) 0.4 mg capsule Take 1 capsule by mouth daily, Disp-30 capsule, R-0Print              Details   !! methocarbamol (ROBAXIN) 500 MG tablet Take 500 mg by mouth 3 times daily as neededHistorical Med      !! methocarbamol (ROBAXIN) 500 MG tablet PLEASE SEE ATTACHED FOR DETAILED DIRECTIONSHistorical Med      Coenzyme Q10 50 MG CAPS Take 50 mg by mouth dailyHistorical Med      GARLIC PO Take by mouthHistorical Med      KRILL OIL PO Take by mouthHistorical Med aspirin 81 MG EC tablet Take 1 tablet by mouth daily. , Disp-30 tablet, R-0      therapeutic multivitamin-minerals (THERAGRAN-M) tablet Take 1 tablet by mouth daily. atorvastatin (LIPITOR) 20 MG tablet Take 20 mg by mouth daily. !! - Potential duplicate medications found. Please discuss with provider. Time Spent on discharge is more than 30 minutes in the examination, evaluation, counseling and review of medications and discharge plan. Signed:    Jose Sifuentes MD   6/7/2022      Thank you Chery Smith MD for the opportunity to be involved in this patient's care. If you have any questions or concerns please feel free to contact me at [phone].

## 2022-06-07 NOTE — PROGRESS NOTES
Pt given d/c instructions. Girlfriend who is an RN was given d/c instructions over phone with permission from pt. Pt given paper scripts. Pt d/c'd to home with all belongings.

## 2022-06-07 NOTE — PROGRESS NOTES
Urology Attending Progress Note      Subjective: pt has right flank pain    Vitals:  BP (!) 170/91   Pulse 87   Temp 98.1 °F (36.7 °C) (Oral)   Resp 17   Ht 5' 8\" (1.727 m)   Wt 205 lb 9 oz (93.2 kg)   SpO2 93%   BMI 31.26 kg/m²   Temp  Av.4 °F (36.9 °C)  Min: 97.9 °F (36.6 °C)  Max: 98.9 °F (37.2 °C)    Intake/Output Summary (Last 24 hours) at 2022 1426  Last data filed at 2022 1258  Gross per 24 hour   Intake 560 ml   Output 825 ml   Net -265 ml       Exam: abd soft    Labs:  WBC:    Lab Results   Component Value Date    WBC 13.3 2022     Hemoglobin/Hematocrit:    Lab Results   Component Value Date    HGB 15.1 2022    HCT 44.5 2022     BMP:    Lab Results   Component Value Date     2022    K 3.8 2022     2022    CO2 26 2022    BUN 16 2022    LABALBU 4.3 2022    CREATININE 1.1 2022    CALCIUM 9.1 2022    GFRAA >60 2022    GFRAA 97 10/07/2011    LABGLOM >60 2022     PT/INR:    Lab Results   Component Value Date    PROTIME 11.4 2017    INR 0.98 2017     PTT:    Lab Results   Component Value Date    APTT 29.6 2017   [APTT    Impression/Plan: flank pain  1. Right ureteroscopy yesterday showed no stone, if pain is from inflammation this will improve with time  2.  [id number] Meghan Brar for d/c    Shelbi Watkins MD

## 2022-06-08 ENCOUNTER — CARE COORDINATION (OUTPATIENT)
Dept: CASE MANAGEMENT | Age: 72
End: 2022-06-08

## 2022-06-08 DIAGNOSIS — N20.0 RIGHT NEPHROLITHIASIS: Primary | ICD-10-CM

## 2022-06-08 PROCEDURE — 1111F DSCHRG MED/CURRENT MED MERGE: CPT | Performed by: PHYSICAL MEDICINE & REHABILITATION

## 2022-06-08 NOTE — CARE COORDINATION
Jesús [facility] Transitions Initial Follow Up Call    Call within 2 business days of discharge: Yes    Patient: Jim Lynch Patient : 1950   MRN: [id number]  Reason for Admission: Flank pain   Discharge Date: 22 RARS: Readmission Risk Score: 4.5 ( )      Last Discharge [facility]       Complaint Diagnosis Description Type Department Provider    22 Back Pain Right ureteral stone . .. ED to Hosp-Admission (Discharged) (Jeevan Ontiveros) Boogie Luke MD; Courtney Kamara. .. Spoke with: [address] [place] [address]: [facility]     Non-face-to-face services provided:  Obtained and reviewed discharge summary and/or continuity of care documents  Education of patient/family/caregiver/guardian to support self-management-.     Transitions of Care Initial Call    Was this an external facility discharge? No Discharge Facility: n/a    Challenges to be reviewed by the provider   Additional needs identified to be addressed with provider: No  none             Method of communication with provider : none    Advance Care Planning:   Does patient have an Advance Directive: patient declined education. Care Transition Nurse contacted the patient by telephone to perform post hospital discharge assessment. Verified name and  with patient as identifiers. Provided introduction to self, and explanation of the CTN role. CTN reviewed discharge instructions, medical action plan and red flags with family who verbalized understanding. Patient given an opportunity to ask questions and does not have any further questions or concerns at this time. Were discharge instructions available to patient? Yes. Reviewed appropriate site of care based on symptoms and resources available to patient including: PCP  Specialist. The patient agrees to contact the PCP office for questions related to their healthcare.      Medication reconciliation was performed with patient, who verbalizes understanding of administration of home medications. Advised obtaining a 90-day supply of all daily and as-needed medications. Was patient discharged with a pulse oximeter? no    CTN provided contact information. No further follow-up call indicated based on severity of symptoms and risk factors. CTN spoke with patient who reported he was continuing to have back pain. Patient denied any issues with urination. Patient denied any fevers or nausea. CTN reviewed all medications with patient. Patient reported he was to follow up with urologist as needed and CTN encouraged patient to contact PCP to setup follow up apt. CTN also encouraged patient to increase fluids and report any worsening pain, nausea, vomiting, or fevers. An unidentified woman then spoke up on the phone with patient and reported she did not need CTN to explain all of this and declined any further calls. Care Transitions 24 Hour Call    Do you have a copy of your discharge instructions?: Yes  Do you have all of your prescriptions and are they filled?: Yes  Have you been contacted by a [address]?: No  Have you scheduled your follow up appointment?: No  Do you feel like you have everything you need to keep you well at home?: Yes  Care Transitions Interventions         Follow Up  No future appointments.     Lisa CHANG, RN, [facility]  Care Transition Nurse  [phone] mobile

## 2023-08-18 ENCOUNTER — APPOINTMENT (OUTPATIENT)
Dept: CT IMAGING | Age: 73
End: 2023-08-18
Payer: MEDICARE

## 2023-08-18 ENCOUNTER — APPOINTMENT (OUTPATIENT)
Dept: GENERAL RADIOLOGY | Age: 73
End: 2023-08-18
Payer: MEDICARE

## 2023-08-18 ENCOUNTER — HOSPITAL ENCOUNTER (EMERGENCY)
Age: 73
Discharge: ANOTHER HOSPITAL | End: 2023-08-18
Attending: EMERGENCY MEDICINE
Payer: MEDICARE

## 2023-08-18 VITALS
DIASTOLIC BLOOD PRESSURE: 75 MMHG | OXYGEN SATURATION: 96 % | HEIGHT: 70 IN | RESPIRATION RATE: 14 BRPM | SYSTOLIC BLOOD PRESSURE: 138 MMHG | HEART RATE: 95 BPM | WEIGHT: 202.82 LBS | BODY MASS INDEX: 29.04 KG/M2 | TEMPERATURE: 97.7 F

## 2023-08-18 DIAGNOSIS — S32.009A CLOSED FRACTURE OF TRANSVERSE PROCESS OF LUMBAR VERTEBRA, INITIAL ENCOUNTER (HCC): ICD-10-CM

## 2023-08-18 DIAGNOSIS — R40.4 TRANSIENT ALTERATION OF AWARENESS: ICD-10-CM

## 2023-08-18 DIAGNOSIS — K66.1 RETROPERITONEAL HEMATOMA: Primary | ICD-10-CM

## 2023-08-18 LAB
ALBUMIN SERPL-MCNC: 4.8 G/DL (ref 3.4–5)
ALBUMIN/GLOB SERPL: 1.9 {RATIO} (ref 1.1–2.2)
ALP SERPL-CCNC: 66 U/L (ref 40–129)
ALT SERPL-CCNC: 75 U/L (ref 10–40)
ANION GAP SERPL CALCULATED.3IONS-SCNC: 12 MMOL/L (ref 3–16)
AST SERPL-CCNC: 90 U/L (ref 15–37)
BASOPHILS # BLD: 0 K/UL (ref 0–0.2)
BASOPHILS NFR BLD: 0.3 %
BILIRUB SERPL-MCNC: 0.4 MG/DL (ref 0–1)
BUN SERPL-MCNC: 14 MG/DL (ref 7–20)
CALCIUM SERPL-MCNC: 9.7 MG/DL (ref 8.3–10.6)
CHLORIDE SERPL-SCNC: 102 MMOL/L (ref 99–110)
CO2 SERPL-SCNC: 27 MMOL/L (ref 21–32)
CREAT SERPL-MCNC: 1.1 MG/DL (ref 0.8–1.3)
DEPRECATED RDW RBC AUTO: 13.1 % (ref 12.4–15.4)
EOSINOPHIL # BLD: 0.1 K/UL (ref 0–0.6)
EOSINOPHIL NFR BLD: 0.7 %
GFR SERPLBLD CREATININE-BSD FMLA CKD-EPI: >60 ML/MIN/{1.73_M2}
GLUCOSE SERPL-MCNC: 110 MG/DL (ref 70–99)
HCT VFR BLD AUTO: 47.4 % (ref 40.5–52.5)
HGB BLD-MCNC: 16.2 G/DL (ref 13.5–17.5)
INR PPP: 0.95 (ref 0.84–1.16)
LACTATE BLDV-SCNC: 1.9 MMOL/L (ref 0.4–1.9)
LYMPHOCYTES # BLD: 1.2 K/UL (ref 1–5.1)
LYMPHOCYTES NFR BLD: 7 %
MCH RBC QN AUTO: 30.3 PG (ref 26–34)
MCHC RBC AUTO-ENTMCNC: 34.1 G/DL (ref 31–36)
MCV RBC AUTO: 88.7 FL (ref 80–100)
MONOCYTES # BLD: 0.8 K/UL (ref 0–1.3)
MONOCYTES NFR BLD: 4.9 %
NEUTROPHILS # BLD: 14.9 K/UL (ref 1.7–7.7)
NEUTROPHILS NFR BLD: 87.1 %
NT-PROBNP SERPL-MCNC: 41 PG/ML (ref 0–124)
PLATELET # BLD AUTO: 268 K/UL (ref 135–450)
PMV BLD AUTO: 8 FL (ref 5–10.5)
POTASSIUM SERPL-SCNC: 3.9 MMOL/L (ref 3.5–5.1)
PROT SERPL-MCNC: 7.3 G/DL (ref 6.4–8.2)
PROTHROMBIN TIME: 12.6 SEC (ref 11.5–14.8)
RBC # BLD AUTO: 5.34 M/UL (ref 4.2–5.9)
SODIUM SERPL-SCNC: 141 MMOL/L (ref 136–145)
TROPONIN, HIGH SENSITIVITY: 17 NG/L (ref 0–22)
WBC # BLD AUTO: 17.1 K/UL (ref 4–11)

## 2023-08-18 PROCEDURE — 80053 COMPREHEN METABOLIC PANEL: CPT

## 2023-08-18 PROCEDURE — 83880 ASSAY OF NATRIURETIC PEPTIDE: CPT

## 2023-08-18 PROCEDURE — 36415 COLL VENOUS BLD VENIPUNCTURE: CPT

## 2023-08-18 PROCEDURE — 99285 EMERGENCY DEPT VISIT HI MDM: CPT

## 2023-08-18 PROCEDURE — 70450 CT HEAD/BRAIN W/O DYE: CPT

## 2023-08-18 PROCEDURE — 71275 CT ANGIOGRAPHY CHEST: CPT

## 2023-08-18 PROCEDURE — 72125 CT NECK SPINE W/O DYE: CPT

## 2023-08-18 PROCEDURE — 70498 CT ANGIOGRAPHY NECK: CPT

## 2023-08-18 PROCEDURE — 6360000004 HC RX CONTRAST MEDICATION: Performed by: EMERGENCY MEDICINE

## 2023-08-18 PROCEDURE — 71045 X-RAY EXAM CHEST 1 VIEW: CPT

## 2023-08-18 PROCEDURE — 83605 ASSAY OF LACTIC ACID: CPT

## 2023-08-18 PROCEDURE — 6360000004 HC RX CONTRAST MEDICATION: Performed by: NURSE PRACTITIONER

## 2023-08-18 PROCEDURE — 84484 ASSAY OF TROPONIN QUANT: CPT

## 2023-08-18 PROCEDURE — 93005 ELECTROCARDIOGRAM TRACING: CPT | Performed by: NURSE PRACTITIONER

## 2023-08-18 PROCEDURE — 96374 THER/PROPH/DIAG INJ IV PUSH: CPT

## 2023-08-18 PROCEDURE — 85610 PROTHROMBIN TIME: CPT

## 2023-08-18 PROCEDURE — 6360000002 HC RX W HCPCS: Performed by: NURSE PRACTITIONER

## 2023-08-18 PROCEDURE — 85025 COMPLETE CBC W/AUTO DIFF WBC: CPT

## 2023-08-18 PROCEDURE — 96375 TX/PRO/DX INJ NEW DRUG ADDON: CPT

## 2023-08-18 RX ORDER — MORPHINE SULFATE 4 MG/ML
4 INJECTION, SOLUTION INTRAMUSCULAR; INTRAVENOUS ONCE
Status: COMPLETED | OUTPATIENT
Start: 2023-08-18 | End: 2023-08-18

## 2023-08-18 RX ORDER — ONDANSETRON 2 MG/ML
4 INJECTION INTRAMUSCULAR; INTRAVENOUS ONCE
Status: COMPLETED | OUTPATIENT
Start: 2023-08-18 | End: 2023-08-18

## 2023-08-18 RX ORDER — FENTANYL CITRATE 50 UG/ML
50 INJECTION, SOLUTION INTRAMUSCULAR; INTRAVENOUS ONCE
Status: COMPLETED | OUTPATIENT
Start: 2023-08-18 | End: 2023-08-18

## 2023-08-18 RX ADMIN — MORPHINE SULFATE 4 MG: 4 INJECTION, SOLUTION INTRAMUSCULAR; INTRAVENOUS at 17:20

## 2023-08-18 RX ADMIN — HYDROMORPHONE HYDROCHLORIDE 0.5 MG: 1 INJECTION, SOLUTION INTRAMUSCULAR; INTRAVENOUS; SUBCUTANEOUS at 18:55

## 2023-08-18 RX ADMIN — FENTANYL CITRATE 50 MCG: 50 INJECTION, SOLUTION INTRAMUSCULAR; INTRAVENOUS at 21:56

## 2023-08-18 RX ADMIN — IOPAMIDOL 75 ML: 755 INJECTION, SOLUTION INTRAVENOUS at 18:10

## 2023-08-18 RX ADMIN — IOPAMIDOL 85 ML: 755 INJECTION, SOLUTION INTRAVENOUS at 17:00

## 2023-08-18 RX ADMIN — ONDANSETRON 4 MG: 2 INJECTION INTRAMUSCULAR; INTRAVENOUS at 17:20

## 2023-08-18 ASSESSMENT — PAIN SCALES - GENERAL
PAINLEVEL_OUTOF10: 10
PAINLEVEL_OUTOF10: 9
PAINLEVEL_OUTOF10: 7

## 2023-08-18 ASSESSMENT — LIFESTYLE VARIABLES
HOW MANY STANDARD DRINKS CONTAINING ALCOHOL DO YOU HAVE ON A TYPICAL DAY: PATIENT DOES NOT DRINK
HOW OFTEN DO YOU HAVE A DRINK CONTAINING ALCOHOL: NEVER

## 2023-08-18 ASSESSMENT — PAIN DESCRIPTION - LOCATION: LOCATION: BACK;CHEST

## 2023-08-18 ASSESSMENT — PAIN - FUNCTIONAL ASSESSMENT: PAIN_FUNCTIONAL_ASSESSMENT: 0-10

## 2023-08-18 NOTE — ED NOTES
Lauren Lozoya NP [facility] Stroke team was called at [address] [facility],  [address]  22/69/21 2137

## 2023-08-18 NOTE — ED PROVIDER NOTES
I independently performed a history and physical on Casi Melvin. All diagnostic, treatment, and disposition decisions were made by myself in conjunction with the advanced practice provider/resident. For further details of [facility] emergency department encounter, please see the AFSHAN/resident's documentation. I personally saw the patient and performed a substantive portion of the visit including all aspects of the medical decision making. Briefly, this is a 65-year-old male presenting for evaluation of chest pain, back pain. I was asked to see the patient shortly after his arrival due to his pain in the low back with concern for aortic pathology. Bedside aortic ultrasound was attempted however we were not able to obtain appropriate aortic views due to body habitus. Patient was sent emergently to the CT scanner for CTA of the chest abdomen pelvis concern for aortic pathology. Patient is agreeable with this plan. He is not on anticoagulation. After family arrived, they report concern of him having confusion, driving erratically and not recalling the initial parts of the ED visit. His symptoms could be consistent with a global transient amnesia. Code stroke was called upon this time and we will obtain CT CTA head and neck imaging for further evaluation. I personally saw the patient and independently provided 15 minutes of non-concurrent critical care out of the total shared critical care time provided. I, Dr. Taveras, am the primary clinician of record. Comment: Please note this report has been produced using speech recognition software and may contain errors related to that system including errors in grammar, punctuation, and spelling, as well as words and phrases that may be inappropriate. If there are any questions or concerns please feel free to contact the dictating provider for clarification.       EKG  The Ekg interpreted by myself in the emergency department in the absence of a

## 2023-08-18 NOTE — ED NOTES
Sent out the ADT20 to have this patient transferred to [facility] ED for trauma. Elana Wells is currently speaking to ED dr at [facility].       Vester Halsted  54/73/70 [id number]

## 2023-08-18 NOTE — ED NOTES
[facility] called with transport. [facility] will be picking up the patient at 2200.      [facility]  08/18/23 1934

## 2023-08-19 LAB
EKG ATRIAL RATE: 89 BPM
EKG DIAGNOSIS: NORMAL
EKG P AXIS: 46 DEGREES
EKG P-R INTERVAL: 194 MS
EKG Q-T INTERVAL: 360 MS
EKG QRS DURATION: 96 MS
EKG QTC CALCULATION (BAZETT): 438 MS
EKG R AXIS: 7 DEGREES
EKG T AXIS: 33 DEGREES
EKG VENTRICULAR RATE: 89 BPM

## 2023-08-19 PROCEDURE — 93010 ELECTROCARDIOGRAM REPORT: CPT | Performed by: INTERNAL MEDICINE

## 2023-08-19 NOTE — ED NOTES
Report called to  at this time. Updated on pt condition and transport ETA.      Josr Hernandez RN  08/18/23 2122

## 2023-08-22 ASSESSMENT — ENCOUNTER SYMPTOMS
VOMITING: 0
SHORTNESS OF BREATH: 0
BACK PAIN: 1
NAUSEA: 0
DIARRHEA: 0
EYE PAIN: 0
ABDOMINAL PAIN: 0
BLOOD IN STOOL: 0
COUGH: 0

## (undated) DEVICE — GUIDEWIRE URO L150CM DIA0.035IN TAPR 8CM STR TIP STD SHFT

## (undated) DEVICE — GLOVE ORANGE PI 7 1/2   MSG9075

## (undated) DEVICE — CYSTO: Brand: MEDLINE INDUSTRIES, INC.

## (undated) DEVICE — SINGLE ACTION PUMPING SYSTEM

## (undated) DEVICE — BAG DRNGE COMB PK

## (undated) DEVICE — SYRINGE MED 10ML LUERLOCK TIP W/O SFTY DISP

## (undated) DEVICE — GUIDEWIRE ENDO L150CM DIA0.035IN STR TIP

## (undated) DEVICE — SYSTEM PMP VAC SYR 10CC CONT FLO SGL ACT 1 W VLV SAPS

## (undated) DEVICE — SOLUTION IRRIG 2000ML STRL H2O UROMATIC PLAS CONT USP